# Patient Record
Sex: FEMALE | Race: WHITE | NOT HISPANIC OR LATINO | Employment: UNEMPLOYED | URBAN - METROPOLITAN AREA
[De-identification: names, ages, dates, MRNs, and addresses within clinical notes are randomized per-mention and may not be internally consistent; named-entity substitution may affect disease eponyms.]

---

## 2021-09-09 ENCOUNTER — HOSPITAL ENCOUNTER (EMERGENCY)
Facility: HOSPITAL | Age: 61
Discharge: HOME | End: 2021-09-10
Attending: EMERGENCY MEDICINE | Admitting: EMERGENCY MEDICINE

## 2021-09-09 DIAGNOSIS — R23.8 COVID TOES: Primary | ICD-10-CM

## 2021-09-09 DIAGNOSIS — U07.1 COVID TOES: Primary | ICD-10-CM

## 2021-09-09 LAB — SARS-COV-2 RNA PNL SPEC NAA+PROBE: DETECTED

## 2021-09-09 PROCEDURE — 99283 EMERGENCY DEPT VISIT LOW MDM: CPT

## 2021-09-09 PROCEDURE — 87635 SARS-COV-2 COVID-19 AMP PRB: CPT | Performed by: EMERGENCY MEDICINE

## 2021-09-09 PROCEDURE — C9803 HOPD COVID-19 SPEC COLLECT: HCPCS | Performed by: EMERGENCY MEDICINE

## 2021-09-09 RX ORDER — DIPHENHYDRAMINE HYDROCHLORIDE 50 MG/ML
50 INJECTION INTRAMUSCULAR; INTRAVENOUS ONCE AS NEEDED
Status: DISCONTINUED | OUTPATIENT
Start: 2021-09-09 | End: 2021-09-10 | Stop reason: HOSPADM

## 2021-09-09 RX ORDER — EPINEPHRINE 1 MG/ML
0.3 INJECTION, SOLUTION, CONCENTRATE INTRAVENOUS ONCE AS NEEDED
Status: DISCONTINUED | OUTPATIENT
Start: 2021-09-09 | End: 2021-09-10 | Stop reason: HOSPADM

## 2021-09-09 RX ORDER — SODIUM CHLORIDE 9 MG/ML
30 INJECTION, SOLUTION INTRAVENOUS ONCE
Status: COMPLETED | OUTPATIENT
Start: 2021-09-10 | End: 2021-09-10

## 2021-09-09 RX ORDER — METHYLPREDNISOLONE SODIUM SUCCINATE 125 MG/2ML
125 INJECTION, POWDER, LYOPHILIZED, FOR SOLUTION INTRAMUSCULAR; INTRAVENOUS ONCE AS NEEDED
Status: DISCONTINUED | OUTPATIENT
Start: 2021-09-09 | End: 2021-09-10 | Stop reason: HOSPADM

## 2021-09-10 VITALS
BODY MASS INDEX: 27.14 KG/M2 | RESPIRATION RATE: 16 BRPM | TEMPERATURE: 100.3 F | WEIGHT: 189.6 LBS | SYSTOLIC BLOOD PRESSURE: 95 MMHG | OXYGEN SATURATION: 95 % | DIASTOLIC BLOOD PRESSURE: 60 MMHG | HEART RATE: 52 BPM | HEIGHT: 70 IN

## 2021-09-10 PROCEDURE — M0243 CASIRIVI AND IMDEVI INFUSION: HCPCS | Performed by: EMERGENCY MEDICINE

## 2021-09-10 PROCEDURE — 25010000006 INJECTION, CASIRIVIMAB AND IMDEVIMAB, 1200 MG: Performed by: EMERGENCY MEDICINE

## 2021-09-10 RX ADMIN — SODIUM CHLORIDE 30 ML: 9 INJECTION, SOLUTION INTRAVENOUS at 01:01

## 2021-09-10 RX ADMIN — CASIRIVIMAB AND IMDEVIMAB 1200 MG: 600; 600 INJECTION, SOLUTION, CONCENTRATE INTRAVENOUS at 00:40

## 2021-09-10 NOTE — ED NOTES
Pt reports cough, fever, body aches, loss of taste, and chills x1week        Camille Hendricks RN  09/10/21 0015

## 2021-09-10 NOTE — DISCHARGE INSTRUCTIONS
Remained isolated for 10 to 14 days until symptoms have improved.  May use Tylenol, ibuprofen for pain, return new or worsening symptoms or trouble breathing.

## 2021-09-10 NOTE — ED PROVIDER NOTES
Subjective   History of Present Illness  61-year-old female presents with cough headache mild body aches.  She has had nausea little bit of diarrhea.  She does not complain of being short of breath at this time.  She has had some fevers.  She states symptoms started 1 week ago today.  She has not been vaccinated.  Review of Systems  Negative for vomiting positive for headache cough body aches nausea little bit of diarrhea  No past medical history on file.  Anxiety depression  No Known Allergies    No past surgical history on file.    No family history on file.    Social History     Socioeconomic History   • Marital status:      Spouse name: Not on file   • Number of children: Not on file   • Years of education: Not on file   • Highest education level: Not on file   Only home medication Zoloft        Objective   Physical Exam  51-year-old female awake alert.  Generally well-developed well-nourished.  Pupils equal round at light.  Neck supple chest clear equal breath sounds cardiovascular regular and rhythm abdomen soft nontender extremity no tenderness edema.  Skin without rash noted.  Neurologically without focal findings noted  Procedures           ED Course      Results for orders placed or performed during the hospital encounter of 09/09/21   COVID-19,CEPHEID/URIEL/BDMAX,COR/WILSON/PAD/MO IN-HOUSE(OR EMERGENT/ADD-ON),NP SWAB IN TRANSPORT MEDIA 3-4 HR TAT, RT-PCR - Swab, Nasopharynx    Specimen: Nasopharynx; Swab   Result Value Ref Range    COVID19 Detected (C) Not Detected - Ref. Range     No radiology results for the last day  Medications   EPINEPHrine PF (ADRENALIN) injection 0.3 mg (has no administration in time range)   diphenhydrAMINE (BENADRYL) injection 50 mg (has no administration in time range)   methylPREDNISolone sodium succinate (SOLU-Medrol) injection 125 mg (has no administration in time range)   INV casirivimab / imdevimab 1200 mg in 60 mL NS IVPB (premix) ( Intravenous Stopped 9/10/21 0100)  "  sodium chloride 0.9 % infusion 30 mL (30 mL Intravenous New Bag 9/10/21 0101)     BP 90/54 (BP Location: Right arm, Patient Position: Sitting)   Pulse 63   Temp 99.5 °F (37.5 °C) (Oral)   Resp 16   Ht 177.8 cm (70\")   Wt 86 kg (189 lb 9.5 oz)   SpO2 94%   BMI 27.20 kg/m²                                        MDM  No previous records for review.  Patient's findings were discussed with her.  She had antibody infusion discussed.  After discussion elected to proceed.  This was administered without complication.  She was discharged.  Advised to remain quarantined.  To use Tylenol ibuprofen for aches pain or fever.  To return new or worsening symptoms or if with develop trouble breathing.  Advised could obtain pulse oximeter to monitor her oxygen at home.  Final diagnoses:   COVID toes       ED Disposition  ED Disposition     ED Disposition Condition Comment    Discharge Stable           PATIENT CONNECTION - [facility] [[email or web address]] [id number]  [phone]             Medication List      No changes were made to your prescriptions during this visit.          Paul Cahparro MD  09/10/21 0127    "

## 2023-08-09 ENCOUNTER — OFFICE VISIT (OUTPATIENT)
Dept: FAMILY MEDICINE CLINIC | Facility: CLINIC | Age: 63
End: 2023-08-09

## 2023-08-09 VITALS
DIASTOLIC BLOOD PRESSURE: 60 MMHG | HEART RATE: 56 BPM | SYSTOLIC BLOOD PRESSURE: 114 MMHG | RESPIRATION RATE: 18 BRPM | BODY MASS INDEX: 29.58 KG/M2 | WEIGHT: 206.6 LBS | OXYGEN SATURATION: 96 % | HEIGHT: 70 IN | TEMPERATURE: 97.5 F

## 2023-08-09 DIAGNOSIS — L98.9 LESION OF SKIN OF CHEEK: ICD-10-CM

## 2023-08-09 DIAGNOSIS — Z85.038 HISTORY OF COLON CANCER: ICD-10-CM

## 2023-08-09 DIAGNOSIS — Z28.21 TETANUS, DIPHTHERIA, AND ACELLULAR PERTUSSIS (TDAP) VACCINATION DECLINED: ICD-10-CM

## 2023-08-09 DIAGNOSIS — F33.42 RECURRENT MAJOR DEPRESSIVE DISORDER, IN FULL REMISSION: ICD-10-CM

## 2023-08-09 DIAGNOSIS — Z11.59 NEED FOR HEPATITIS C SCREENING TEST: ICD-10-CM

## 2023-08-09 DIAGNOSIS — Z12.31 BREAST CANCER SCREENING BY MAMMOGRAM: ICD-10-CM

## 2023-08-09 DIAGNOSIS — E66.3 OVERWEIGHT (BMI 25.0-29.9): ICD-10-CM

## 2023-08-09 DIAGNOSIS — Z00.00 WELLNESS EXAMINATION: Primary | ICD-10-CM

## 2023-08-09 NOTE — PROGRESS NOTES
Chief Complaint  Annual Exam, Depression, and Establish Care    Subjective          Mali is a 62 y.o. female  who presents to [facility]     Patient Care Team:  Hui Noe APRN as PCP - General (Family Medicine)     History of Present Illness  Mali is here today to establish care.  Previous PCP Dr. Garrett Abdi MD in [place]  New patient    Adult Annual Wellness    Social History    Tobacco Use      Smoking status: Former        Packs/day: 1.00        Years: 4.00        Pack years: 4        Types: Cigarettes        Quit date: 1981        Years since quittin.7      Smokeless tobacco: Never    Vaping Use      Vaping Use: Never used    Alcohol use: Not Currently    Drug use: Not Currently      Comment: I dont remember    General health habits  Last eye exam - in the last year, wears glasses for reading  Last dental exam - a few years ago    Diet - Regular diet    Weight / BMI - overweight, BMI 29.6    Exercise -  no    Hours of sleep per night ?    Safety -   Do you use seat belts consistently ? yes  Working smoke detector in home ? yes      Reproductive health -  Sexually active -  yes  Birth control - menopause  History of abnormal pap smear? No    Screening/prevention  Last mammogram - unsure date  Last pap smear/ cervical cancer screening - unsure date  Colon cancer screening - unsure date  Immunizations - needs Tdap but declines today    Dx with colon cancer in late    S/p colon surgery (removed 12-18 inches) in [place], [facility] chemo and radiation after surgery in [place]  Last colonoscopy after surgery but unsure date.    Patient presents for follow-up of depression  This is an established problem.    She has been on sertraline for about 3 years  Interim treatment changes: none  Current medications: sertraline 50 mg daily  She needs a refill.    Lesion inside mouth left cheek  Lesion for 2 months  Not painful  It changes in size      Mali  Soo  has a past medical history of Depression, Dyslipidemia, and History of colon cancer.      Review of Systems   Constitutional:  Negative for fatigue and fever.   HENT:  Negative for ear pain, sore throat, trouble swallowing and voice change.    Eyes:  Negative for visual disturbance.   Respiratory:  Negative for cough and shortness of breath.    Cardiovascular:  Negative for chest pain, palpitations and leg swelling.   Gastrointestinal:  Negative for abdominal pain, blood in stool, nausea and vomiting.        No heartburn   Genitourinary:  Negative for dysuria, frequency, hematuria and urgency.   Musculoskeletal:  Positive for arthralgias (knees). Negative for myalgias.   Skin:  Negative for rash.   Neurological:  Negative for dizziness and headaches.   Psychiatric/Behavioral:  Positive for dysphoric mood (on zoloft).       Family History   Problem Relation Age of Onset    Hypertension Mother     Diabetes Father     Lung cancer Father     Cancer Sister     Kidney cancer Maternal Grandmother     Lymphoma Maternal Grandfather         Past Surgical History:   Procedure Laterality Date    CHOLE[place]STECTOMY      in [place]    COLON SURGERY      12-18 inches colon removed due to cancer; [place]    COLONOSCOPY      in [place] Dr. Beck        Social History     Socioeconomic History    Marital status:      Spouse name: Dilip    Number of children: 4    Highest education level: High school graduate   Tobacco Use    Smoking status: Former     Packs/day: 1.00     Years: 4.00     Pack years: 4.00     Types: Cigarettes     Quit date: 1981     Years since quittin.7    Smokeless tobacco: Never   Vaping Use    Vaping Use: Never used   Substance and Sexual Activity    Alcohol use: Not Currently    Drug use: Not Currently     Comment: I dont remember    Sexual activity: Yes     Partners: Male     Birth control/protection: Post-menopausal          There is no immunization history on  "file for this patient.    Menstrual History:  OB History          4    Para   4    Term   4            AB   0    Living   4         SAB        IAB        Ectopic        Molar        Multiple        Live Births   4          Obstetric Comments   Menarche - unsure age  LMP - in 30's               No LMP recorded. Patient is postmenopausal.         Objective       Current Outpatient Medications:     sertraline (ZOLOFT) 50 MG tablet, Take 1 tablet by mouth Daily., Disp: 90 tablet, Rfl: 1    Vital Signs:      /60 (BP Location: Right arm, Patient Position: Sitting, Cuff Size: Large Adult)   Pulse 56   Temp 97.5 øF (36.4 øC) (Temporal)   Resp 18   Ht 177.8 cm (70\")   Wt 93.7 kg (206 lb 9.6 oz)   SpO2 96%   BMI 29.64 kg/mý     Vitals:    23 1044   BP: 114/60   BP Location: Right arm   Patient Position: Sitting   Cuff Size: Large Adult   Pulse: 56   Resp: 18   Temp: 97.5 øF (36.4 øC)   TempSrc: Temporal   SpO2: 96%   Weight: 93.7 kg (206 lb 9.6 oz)   Height: 177.8 cm (70\")   PainSc:   5   PainLoc: Knee      Physical Exam  Vitals reviewed.   Constitutional:       General: She is not in acute distress.     Appearance: Normal appearance.   HENT:      Head: Normocephalic and atraumatic.      Right Ear: Tympanic membrane, ear canal and external ear normal.      Left Ear: Tympanic membrane, ear canal and external ear normal.      Nose: Nose normal.      Mouth/Throat:      Mouth: Mucous membranes are moist.      Pharynx: Oropharynx is clear.      Comments: Purplish lesion inside mouth in left cheek.  Not painful.  Eyes:      General: No scleral icterus.     Conjunctiva/sclera: Conjunctivae normal.   Neck:      Thyroid: No thyromegaly.   Cardiovascular:      Rate and Rhythm: Normal rate and regular rhythm.      Heart sounds: Normal heart sounds.   Pulmonary:      Effort: Pulmonary effort is normal. No respiratory distress.      Breath sounds: Normal breath sounds. No wheezing.   Abdominal:      " General: Bowel sounds are normal. There is no distension.      Palpations: Abdomen is soft. There is no mass.      Tenderness: There is no abdominal tenderness. There is no guarding or rebound.   Musculoskeletal:      Cervical back: Neck supple.      Right lower leg: No edema.      Left lower leg: No edema.   Lymphadenopathy:      Cervical: No cervical adenopathy.   Skin:     General: Skin is warm and dry.   Neurological:      Mental Status: She is alert and oriented to person, place, and time.   Psychiatric:         Mood and Affect: Mood normal.      Result Review :                PHQ-2 Depression Screening  Little interest or pleasure in doing things? 0-->not at all   Feeling down, depressed, or hopeless? 0-->not at all   PHQ-2 Total Score 0          Assessment and Plan    Diagnoses and all orders for this visit:    1. Wellness examination (Primary)  Assessment & Plan:  Discussed preventative healthcare.  Labs ordered.  Recommended annual eye and dental exams.  Breast cancer and colon cancer screening discussed and ordered.  Advised to follow up for cervical cancer screening/pap smear. Tdap declined.    Orders:  -     CBC & Differential  -     Comprehensive Metabolic Panel  -     Lipid Panel  -     TSH Rfx On Abnormal To Free T4    2. Recurrent major depressive disorder, in full remission  Assessment & Plan:  Symptoms stable on sertraline and she would like to continue medication.  Continue sertraline 50 mg daily.    Orders:  -     sertraline (ZOLOFT) 50 MG tablet; Take 1 tablet by mouth Daily.  Dispense: 90 tablet; Refill: 1    3. History of colon cancer  Assessment & Plan:  She has not had a colonoscopy in several years.  She would like to see someone in [place]. Refer to Dr. Roque/Philip's office.    Orders:  -     Ambulatory Referral to General Surgery    4. Lesion of skin of cheek  Comments:  Possible mucocele.  Advised to discuss with her dentist.    5. Breast cancer screening by mammogram  -     Mammo  Screening Digital Tomosynthesis Bilateral With CAD    6. Need for hepatitis C screening test  -     Hepatitis C Antibody    7. Overweight (BMI 25.0-29.9)  Assessment & Plan:  Patient's (Body mass index is 29.64 kg/mý.) indicates that they are overweight with health conditions that include none . Weight is newly identified. BMI is above average; BMI management plan is completed. We discussed portion control and increasing exercise.     Orders:  -     Hemoglobin A1c    8. Tetanus, diphtheria, and acellular pertussis (Tdap) vaccination declined  Comments:  Recommended Tdap vaccine and she declined.       Will request records from previous PCP Dr. Abdi    Follow up for annual gyn exam and pap smear.      Follow Up   Return for Follow up for gyn exam/pap smear.  Patient was given instructions and counseling regarding her condition or for health maintenance advice. Please see specific information pulled into the AVS if appropriate.    Patient Instructions   Go to [facility] for blood work.    Make an appointment for a pap smear/gyn exam,

## 2023-08-11 PROBLEM — E66.3 OVERWEIGHT (BMI 25.0-29.9): Status: ACTIVE | Noted: 2023-08-11

## 2023-08-11 PROBLEM — Z00.00 WELLNESS EXAMINATION: Status: ACTIVE | Noted: 2023-08-11

## 2023-08-11 PROBLEM — Z85.038 HISTORY OF COLON CANCER: Status: ACTIVE | Noted: 2023-08-11

## 2023-08-11 PROBLEM — F33.42 RECURRENT MAJOR DEPRESSIVE DISORDER, IN FULL REMISSION: Status: ACTIVE | Noted: 2023-08-11

## 2023-08-11 NOTE — ASSESSMENT & PLAN NOTE
Discussed preventative healthcare.  Labs ordered.  Recommended annual eye and dental exams.  Breast cancer and colon cancer screening discussed and ordered.  Advised to follow up for cervical cancer screening/pap smear. Tdap declined.

## 2023-08-11 NOTE — ASSESSMENT & PLAN NOTE
Symptoms stable on sertraline and she would like to continue medication.  Continue sertraline 50 mg daily.

## 2023-08-11 NOTE — ASSESSMENT & PLAN NOTE
Patient's (Body mass index is 29.64 kg/mý.) indicates that they are overweight with health conditions that include none . Weight is newly identified. BMI is above average; BMI management plan is completed. We discussed portion control and increasing exercise.

## 2023-08-11 NOTE — ASSESSMENT & PLAN NOTE
She has not had a colonoscopy in several years.  She would like to see someone in [place]. Refer to Dr. Roque/Philip's office.

## 2023-08-15 ENCOUNTER — TELEPHONE (OUTPATIENT)
Dept: FAMILY MEDICINE CLINIC | Facility: CLINIC | Age: 63
End: 2023-08-15
Payer: COMMERCIAL

## 2023-08-15 NOTE — TELEPHONE ENCOUNTER
Caller: Mali Vann    Relationship: Self    Best call back number: [phone]    What is the best time to reach you: ANY     Who are you requesting to speak with (clinical staff, provider,  specific staff member): FAHAD LACKEY     Do you know the name of the person who called:     What was the call regarding: CALL BACK, NOT SURE WHAT THE CALL TO THE PATIENT WAS REGARDING. WOULD LIKE TO SPEAK TO PCP TO CLEAR UP THE CONFUSION.     Is it okay if the provider responds through MyChart: NO

## 2023-08-20 DIAGNOSIS — Z85.038 HISTORY OF COLON CANCER: Primary | ICD-10-CM

## 2023-08-21 NOTE — TELEPHONE ENCOUNTER
Please let her know I put in a referral to [facility] for colonoscopy.  She can come by office to  paperwork to fill out and submit to [facility].

## 2024-01-16 ENCOUNTER — TELEPHONE (OUTPATIENT)
Dept: FAMILY MEDICINE CLINIC | Facility: CLINIC | Age: 64
End: 2024-01-16
Payer: COMMERCIAL

## 2024-01-16 NOTE — TELEPHONE ENCOUNTER
Spoke to patient and she states that she plans on completing the lab orders in chart from August.

## 2024-01-30 DIAGNOSIS — F33.42 RECURRENT MAJOR DEPRESSIVE DISORDER, IN FULL REMISSION: ICD-10-CM

## 2024-02-22 ENCOUNTER — OFFICE VISIT (OUTPATIENT)
Dept: FAMILY MEDICINE CLINIC | Facility: CLINIC | Age: 64
End: 2024-02-22
Payer: COMMERCIAL

## 2024-02-22 VITALS
HEART RATE: 51 BPM | WEIGHT: 212.2 LBS | OXYGEN SATURATION: 96 % | DIASTOLIC BLOOD PRESSURE: 72 MMHG | BODY MASS INDEX: 30.38 KG/M2 | SYSTOLIC BLOOD PRESSURE: 118 MMHG | HEIGHT: 70 IN | TEMPERATURE: 97.7 F | RESPIRATION RATE: 20 BRPM

## 2024-02-22 DIAGNOSIS — R73.03 PREDIABETES: Primary | ICD-10-CM

## 2024-02-22 DIAGNOSIS — Z12.31 BREAST CANCER SCREENING BY MAMMOGRAM: ICD-10-CM

## 2024-02-22 DIAGNOSIS — Z12.11 COLON CANCER SCREENING: ICD-10-CM

## 2024-02-22 DIAGNOSIS — Z28.21 INFLUENZA VACCINATION DECLINED: ICD-10-CM

## 2024-02-22 DIAGNOSIS — R74.8 ELEVATED LIVER ENZYMES: ICD-10-CM

## 2024-02-22 DIAGNOSIS — E78.5 DYSLIPIDEMIA: ICD-10-CM

## 2024-02-22 DIAGNOSIS — Z85.038 HISTORY OF COLON CANCER: ICD-10-CM

## 2024-02-22 DIAGNOSIS — Z28.21 TETANUS, DIPHTHERIA, AND ACELLULAR PERTUSSIS (TDAP) VACCINATION DECLINED: ICD-10-CM

## 2024-02-22 DIAGNOSIS — E66.09 CLASS 1 OBESITY DUE TO EXCESS CALORIES WITH SERIOUS COMORBIDITY AND BODY MASS INDEX (BMI) OF 30.0 TO 30.9 IN ADULT: ICD-10-CM

## 2024-02-22 DIAGNOSIS — F33.42 RECURRENT MAJOR DEPRESSIVE DISORDER, IN FULL REMISSION: ICD-10-CM

## 2024-02-22 PROBLEM — E66.811 CLASS 1 OBESITY DUE TO EXCESS CALORIES WITH SERIOUS COMORBIDITY AND BODY MASS INDEX (BMI) OF 30.0 TO 30.9 IN ADULT: Status: ACTIVE | Noted: 2024-02-22

## 2024-02-22 LAB
ALBUMIN SERPL-MCNC: 4.4 G/DL (ref 3.9–4.9)
ALBUMIN/GLOB SERPL: 1.7 {RATIO} (ref 1.2–2.2)
ALP SERPL-CCNC: 79 IU/L (ref 44–121)
ALT SERPL-CCNC: 56 IU/L (ref 0–32)
AST SERPL-CCNC: 44 IU/L (ref 0–40)
BASOPHILS # BLD AUTO: 0.1 X10E3/UL (ref 0–0.2)
BASOPHILS NFR BLD AUTO: 1 %
BILIRUB SERPL-MCNC: 0.7 MG/DL (ref 0–1.2)
BUN SERPL-MCNC: 8 MG/DL (ref 8–27)
BUN/CREAT SERPL: 9 (ref 12–28)
CALCIUM SERPL-MCNC: 9.2 MG/DL (ref 8.7–10.3)
CHLORIDE SERPL-SCNC: 104 MMOL/L (ref 96–106)
CHOLEST SERPL-MCNC: 268 MG/DL (ref 100–199)
CO2 SERPL-SCNC: 23 MMOL/L (ref 20–29)
CREAT SERPL-MCNC: 0.87 MG/DL (ref 0.57–1)
EGFRCR SERPLBLD CKD-EPI 2021: 75 ML/MIN/1.73
EOSINOPHIL # BLD AUTO: 0.2 X10E3/UL (ref 0–0.4)
EOSINOPHIL NFR BLD AUTO: 4 %
ERYTHROCYTE [DISTWIDTH] IN BLOOD BY AUTOMATED COUNT: 14.5 % (ref 11.7–15.4)
GLOBULIN SER CALC-MCNC: 2.6 G/DL (ref 1.5–4.5)
GLUCOSE SERPL-MCNC: 104 MG/DL (ref 70–99)
HBA1C MFR BLD: 5.9 % (ref 4.8–5.6)
HCT VFR BLD AUTO: 43.2 % (ref 34–46.6)
HCV IGG SERPL QL IA: NON REACTIVE
HDLC SERPL-MCNC: 27 MG/DL
HGB BLD-MCNC: 14.1 G/DL (ref 11.1–15.9)
IMM GRANULOCYTES # BLD AUTO: 0 X10E3/UL (ref 0–0.1)
IMM GRANULOCYTES NFR BLD AUTO: 0 %
LDLC SERPL CALC-MCNC: 178 MG/DL (ref 0–99)
LYMPHOCYTES # BLD AUTO: 1.1 X10E3/UL (ref 0.7–3.1)
LYMPHOCYTES NFR BLD AUTO: 25 %
MCH RBC QN AUTO: 28.7 PG (ref 26.6–33)
MCHC RBC AUTO-ENTMCNC: 32.6 G/DL (ref 31.5–35.7)
MCV RBC AUTO: 88 FL (ref 79–97)
MONOCYTES # BLD AUTO: 0.3 X10E3/UL (ref 0.1–0.9)
MONOCYTES NFR BLD AUTO: 6 %
NEUTROPHILS # BLD AUTO: 2.8 X10E3/UL (ref 1.4–7)
NEUTROPHILS NFR BLD AUTO: 64 %
PLATELET # BLD AUTO: 252 X10E3/UL (ref 150–450)
POTASSIUM SERPL-SCNC: 4 MMOL/L (ref 3.5–5.2)
PROT SERPL-MCNC: 7 G/DL (ref 6–8.5)
RBC # BLD AUTO: 4.92 X10E6/UL (ref 3.77–5.28)
SODIUM SERPL-SCNC: 141 MMOL/L (ref 134–144)
TRIGL SERPL-MCNC: 322 MG/DL (ref 0–149)
TSH SERPL DL<=0.005 MIU/L-ACNC: 2.87 UIU/ML (ref 0.45–4.5)
VLDLC SERPL CALC-MCNC: 63 MG/DL (ref 5–40)
WBC # BLD AUTO: 4.5 X10E3/UL (ref 3.4–10.8)

## 2024-02-22 NOTE — PROGRESS NOTES
Chief Complaint  Hyperlipidemia and Prediabetes    Mady Samson is a 63 y.o. female  who presents to [facility]     Patient Care Team:  Hui Noe APRN as PCP - General (Family Medicine)     History of Present Illness  Today's appointment was scheduled for an GYN exam/ pap smear but she states she is not here for that.  Declined pap smear today    Dx with colon cancer in late 1980's   S/p colon surgery (removed 12-18 inches) in [place], [place]   chemo and radiation after surgery in [place]  Last colonoscopy after surgery but unsure date.  She has paperwork to fill out to schedule a colonoscopy with [facility] but she has not filled them out yet.      Patient presents for follow-up of depression  This is an established problem.  Stable  She has been on sertraline for about 3 years  Interim treatment changes: none  Current medications: sertraline 50 mg daily    She had labs done on 2/21/2024      The 10-year ASCVD risk score (Lurdes TANNER, et al., 2019) is: 9.1%    Values used to calculate the score:      Age: 63 years      Sex: Female      Is Non- : No      Diabetic: No      Tobacco smoker: No      Systolic Blood Pressure: 139 mmHg      Is BP treated: No      HDL Cholesterol: 27 mg/dL      Total Cholesterol: 268 mg/dL      Mali Vann  has a past medical history of Depression, Dyslipidemia, History of colon cancer, and Prediabetes (02/21/2024).      Review of Systems   Constitutional:  Negative for fatigue and fever.   HENT:  Negative for ear pain, sore throat, trouble swallowing and voice change.    Eyes:  Negative for visual disturbance.   Respiratory:  Negative for cough and shortness of breath.    Cardiovascular:  Negative for chest pain, palpitations and leg swelling.   Gastrointestinal:  Negative for abdominal pain, blood in stool, nausea and vomiting.        No heartburn   Genitourinary:  Negative for dysuria, frequency, hematuria and  urgency.   Musculoskeletal:  Negative for myalgias.   Skin:  Negative for rash.   Neurological:  Negative for dizziness and headaches.   Psychiatric/Behavioral:  Positive for dysphoric mood (on zoloft).         Family History   Problem Relation Age of Onset    Hypertension Mother     Diabetes Father     Lung cancer Father     Cancer Sister     Kidney cancer Maternal Grandmother     Lymphoma Maternal Grandfather         Past Surgical History:   Procedure Laterality Date    CHOLECYSTECTOMY      in [place], [place]    COLON SURGERY      12-18 inches colon removed due to cancer; In [place]    COLONOSCOPY      in [place], [place] Dr. Beck        Social History     Socioeconomic History    Marital status:      Spouse name: Dilip    Number of children: 4    Highest education level: High school graduate   Tobacco Use    Smoking status: Former     Packs/day: 1.00     Years: 4.00     Additional pack years: 0.00     Total pack years: 4.00     Types: Cigarettes     Quit date: 1981     Years since quittin.3    Smokeless tobacco: Never   Vaping Use    Vaping Use: Never used   Substance and Sexual Activity    Alcohol use: Not Currently    Drug use: Not Currently     Comment: I dont remember    Sexual activity: Yes     Partners: Male     Birth control/protection: Post-menopausal        There is no immunization history on file for this patient.    Menstrual History:  OB History          4    Para   4    Term   4            AB   0    Living   4         SAB        IAB        Ectopic        Molar        Multiple        Live Births   4          Obstetric Comments   Menarche - unsure age  LMP - in 30's                 No LMP recorded. Patient is postmenopausal.       Objective       Current Outpatient Medications:     sertraline (ZOLOFT) 50 MG tablet, Take 1 tablet by mouth once daily, Disp: 30 tablet, Rfl: 0    Vital Signs:      /72   Pulse 51   Temp 97.7 °F (36.5 °C) (Temporal)   Resp 20   " Ht 177.8 cm (70\")   Wt 96.3 kg (212 lb 3.2 oz)   SpO2 96%   BMI 30.45 kg/m²     Vitals:    02/22/24 1300   BP: 118/72   Pulse: 51   Resp: 20   Temp: 97.7 °F (36.5 °C)   TempSrc: Temporal   SpO2: 96%   Weight: 96.3 kg (212 lb 3.2 oz)   Height: 177.8 cm (70\")   PainSc: 0-No pain      Physical Exam  Vitals reviewed.   Constitutional:       General: She is not in acute distress.     Appearance: Normal appearance.   HENT:      Head: Normocephalic and atraumatic.      Right Ear: Tympanic membrane, ear canal and external ear normal.      Left Ear: Tympanic membrane, ear canal and external ear normal.      Mouth/Throat:      Mouth: Mucous membranes are moist.      Pharynx: Oropharynx is clear.      Comments: Purplish lesion inside mouth in left cheek.  Not painful.  Eyes:      General: No scleral icterus.     Conjunctiva/sclera: Conjunctivae normal.   Neck:      Thyroid: No thyromegaly.   Cardiovascular:      Rate and Rhythm: Normal rate and regular rhythm.      Heart sounds: Normal heart sounds.   Pulmonary:      Effort: Pulmonary effort is normal. No respiratory distress.      Breath sounds: Normal breath sounds.   Musculoskeletal:      Cervical back: Neck supple.      Right lower leg: No edema.      Left lower leg: No edema.   Lymphadenopathy:      Cervical: No cervical adenopathy.   Skin:     General: Skin is warm and dry.   Neurological:      Mental Status: She is alert and oriented to person, place, and time.   Psychiatric:         Mood and Affect: Mood normal.        Result Review :   The following data was reviewed by: RASTA Escalona on 02/22/2024:             No visits with results within 6 Month(s) from this visit.   Latest known visit with results is:   Office Visit on 08/09/2023   Component Date Value Ref Range Status    WBC 02/21/2024 4.5  3.4 - 10.8 x10E3/uL Final    RBC 02/21/2024 4.92  3.77 - 5.28 x10E6/uL Final    Hemoglobin 02/21/2024 14.1  11.1 - 15.9 g/dL Final    Hematocrit 02/21/2024 43.2  " 34.0 - 46.6 % Final    MCV 02/21/2024 88  79 - 97 fL Final    MCH 02/21/2024 28.7  26.6 - 33.0 pg Final    MCHC 02/21/2024 32.6  31.5 - 35.7 g/dL Final    RDW 02/21/2024 14.5  11.7 - 15.4 % Final    Platelets 02/21/2024 252  150 - 450 x10E3/uL Final    Neutrophil Rel % 02/21/2024 64  Not Estab. % Final    Lymphocyte Rel % 02/21/2024 25  Not Estab. % Final    Monocyte Rel % 02/21/2024 6  Not Estab. % Final    Eosinophil Rel % 02/21/2024 4  Not Estab. % Final    Basophil Rel % 02/21/2024 1  Not Estab. % Final    Neutrophils Absolute 02/21/2024 2.8  1.4 - 7.0 x10E3/uL Final    Lymphocytes Absolute 02/21/2024 1.1  0.7 - 3.1 x10E3/uL Final    Monocytes Absolute 02/21/2024 0.3  0.1 - 0.9 x10E3/uL Final    Eosinophils Absolute 02/21/2024 0.2  0.0 - 0.4 x10E3/uL Final    Basophils Absolute 02/21/2024 0.1  0.0 - 0.2 x10E3/uL Final    Immature Granulocyte Rel % 02/21/2024 0  Not Estab. % Final    Immature Grans Absolute 02/21/2024 0.0  0.0 - 0.1 x10E3/uL Final    Glucose 02/21/2024 104 (H)  70 - 99 mg/dL Final    BUN 02/21/2024 8  8 - 27 mg/dL Final    Creatinine 02/21/2024 0.87  0.57 - 1.00 mg/dL Final    EGFR Result 02/21/2024 75  >59 mL/min/1.73 Final    BUN/Creatinine Ratio 02/21/2024 9 (L)  12 - 28 Final    Sodium 02/21/2024 141  134 - 144 mmol/L Final    Potassium 02/21/2024 4.0  3.5 - 5.2 mmol/L Final    Chloride 02/21/2024 104  96 - 106 mmol/L Final    Total CO2 02/21/2024 23  20 - 29 mmol/L Final    Calcium 02/21/2024 9.2  8.7 - 10.3 mg/dL Final    Total Protein 02/21/2024 7.0  6.0 - 8.5 g/dL Final    Albumin 02/21/2024 4.4  3.9 - 4.9 g/dL Final    Globulin 02/21/2024 2.6  1.5 - 4.5 g/dL Final    A/G Ratio 02/21/2024 1.7  1.2 - 2.2 Final    Total Bilirubin 02/21/2024 0.7  0.0 - 1.2 mg/dL Final    Alkaline Phosphatase 02/21/2024 79  44 - 121 IU/L Final    AST (SGOT) 02/21/2024 44 (H)  0 - 40 IU/L Final    ALT (SGPT) 02/21/2024 56 (H)  0 - 32 IU/L Final    Total Cholesterol 02/21/2024 268 (H)  100 - 199 mg/dL Final     Triglycerides 02/21/2024 322 (H)  0 - 149 mg/dL Final    HDL Cholesterol 02/21/2024 27 (L)  >39 mg/dL Final    VLDL Cholesterol Loy 02/21/2024 63 (H)  5 - 40 mg/dL Final    LDL Chol Calc (NIH) 02/21/2024 178 (H)  0 - 99 mg/dL Final    TSH 02/21/2024 2.870  0.450 - 4.500 uIU/mL Final    Hep C Virus Ab 02/21/2024 Non Reactive  Non Reactive Final    Comment: HCV antibody alone does not differentiate between previously  resolved infection and active infection. Equivocal and Reactive  HCV antibody results should be followed up with an HCV RNA test  to support the diagnosis of active HCV infection.      Hemoglobin A1C 02/21/2024 5.9 (H)  4.8 - 5.6 % Final    Comment:          Prediabetes: 5.7 - 6.4           Diabetes: >6.4           Glycemic control for adults with diabetes: <7.0         PHQ-2 Depression Screening  Little interest or pleasure in doing things? 0-->not at all   Feeling down, depressed, or hopeless? 0-->not at all   PHQ-2 Total Score 0          Assessment and Plan    Diagnoses and all orders for this visit:    1. Prediabetes (Primary)  Overview:  Hgba1c 5.9 % on 2/21/2024    Assessment & Plan:  New problem  Make lifestyle changes: low carbohydrate, no concentrated sweets, exercise and weight reduction.  Recheck 6-12 months.      2. Dyslipidemia  Assessment & Plan:  Established problem  Discussed ASCVD risk score.  She declines to begin a statin.  Make lifestyle changes: low fat, low cholesterol diet, exercise and weight loss.  Recheck in 1 year.      3. Elevated liver enzymes  Comments:  Recheck in 6 months    4. Recurrent major depressive disorder, in full remission  Assessment & Plan:  Chronic stable problem.  Continue sertraline 50 mg daily.      5. History of colon cancer  Assessment & Plan:  Refer to Dr. Arizmendi for colonoscopy    Orders:  -     Ambulatory Referral to Colorectal Surgery    6. Colon cancer screening  -     Ambulatory Referral to Colorectal Surgery    7. Breast cancer screening by  mammogram  -     Mammo Screening Digital Tomosynthesis Bilateral With CAD    8. Influenza vaccination declined    9. Tetanus, diphtheria, and acellular pertussis (Tdap) vaccination declined    10. Class 1 obesity due to excess calories with serious comorbidity and body mass index (BMI) of 30.0 to 30.9 in adult  Assessment & Plan:  Patient's (Body mass index is 30.45 kg/m².) indicates that they are obese (BMI >30) with health conditions that include dyslipidemias and prediabetes  . Weight is worsening. BMI  is above average; BMI management plan is completed. We discussed portion control and increasing exercise.            Follow Up   Return in about 6 months (around 8/22/2024) for Annual physical.  Patient was given instructions and counseling regarding her condition or for health maintenance advice. Please see specific information pulled into the AVS if appropriate.    There are no Patient Instructions on file for this visit.

## 2024-02-25 DIAGNOSIS — F33.42 RECURRENT MAJOR DEPRESSIVE DISORDER, IN FULL REMISSION: ICD-10-CM

## 2024-02-25 NOTE — ASSESSMENT & PLAN NOTE
Established problem  Discussed ASCVD risk score.  She declines to begin a statin.  Make lifestyle changes: low fat, low cholesterol diet, exercise and weight loss.  Recheck in 1 year.

## 2024-02-25 NOTE — ASSESSMENT & PLAN NOTE
Patient's (Body mass index is 30.45 kg/m².) indicates that they are obese (BMI >30) with health conditions that include dyslipidemias and prediabetes  . Weight is worsening. BMI  is above average; BMI management plan is completed. We discussed portion control and increasing exercise.

## 2024-02-25 NOTE — ASSESSMENT & PLAN NOTE
New problem  Make lifestyle changes: low carbohydrate, no concentrated sweets, exercise and weight reduction.  Recheck 6-12 months.

## 2024-03-07 ENCOUNTER — HOSPITAL ENCOUNTER (OUTPATIENT)
Dept: MAMMOGRAPHY | Facility: HOSPITAL | Age: 64
Discharge: HOME | End: 2024-03-07
Admitting: NURSE PRACTITIONER
Payer: COMMERCIAL

## 2024-03-07 PROCEDURE — 77067 SCR MAMMO BI INCL CAD: CPT

## 2024-03-07 PROCEDURE — 77063 BREAST TOMOSYNTHESIS BI: CPT

## 2024-03-17 ENCOUNTER — PREP FOR SURGERY (OUTPATIENT)
Dept: OTHER | Facility: HOSPITAL | Age: 64
End: 2024-03-17
Payer: COMMERCIAL

## 2024-03-17 DIAGNOSIS — Z12.11 ENCOUNTER FOR SCREENING COLONOSCOPY: Primary | ICD-10-CM

## 2024-03-17 RX ORDER — SODIUM, POTASSIUM,MAG SULFATES 17.5-3.13G
SOLUTION, RECONSTITUTED, ORAL ORAL
Qty: 177 ML | Refills: 0 | Status: SHIPPED | OUTPATIENT
Start: 2024-03-17

## 2024-03-17 RX ORDER — SODIUM CHLORIDE, SODIUM LACTATE, POTASSIUM CHLORIDE, CALCIUM CHLORIDE 600; 310; 30; 20 MG/100ML; MG/100ML; MG/100ML; MG/100ML
30 INJECTION, SOLUTION INTRAVENOUS CONTINUOUS
OUTPATIENT
Start: 2024-03-17

## 2024-08-20 DIAGNOSIS — F33.42 RECURRENT MAJOR DEPRESSIVE DISORDER, IN FULL REMISSION: ICD-10-CM

## 2024-08-23 DIAGNOSIS — F33.42 RECURRENT MAJOR DEPRESSIVE DISORDER, IN FULL REMISSION: ICD-10-CM

## 2024-08-28 PROBLEM — Z12.11 ENCOUNTER FOR SCREENING COLONOSCOPY: Status: ACTIVE | Noted: 2024-03-17

## 2024-08-30 RX ORDER — SODIUM, POTASSIUM,MAG SULFATES 17.5-3.13G
SOLUTION, RECONSTITUTED, ORAL ORAL
Qty: 177 ML | Refills: 0 | Status: SHIPPED | OUTPATIENT
Start: 2024-08-30 | End: 2024-08-30 | Stop reason: SDUPTHER

## 2024-08-30 RX ORDER — SODIUM, POTASSIUM,MAG SULFATES 17.5-3.13G
SOLUTION, RECONSTITUTED, ORAL ORAL
Qty: 177 ML | Refills: 0 | Status: SHIPPED | OUTPATIENT
Start: 2024-08-30

## 2024-09-13 ENCOUNTER — TELEPHONE (OUTPATIENT)
Age: 64
End: 2024-09-13
Payer: COMMERCIAL

## 2024-09-18 ENCOUNTER — ANESTHESIA (OUTPATIENT)
Dept: GASTROENTEROLOGY | Facility: HOSPITAL | Age: 64
End: 2024-09-18
Payer: COMMERCIAL

## 2024-09-18 ENCOUNTER — ANESTHESIA EVENT (OUTPATIENT)
Dept: GASTROENTEROLOGY | Facility: HOSPITAL | Age: 64
End: 2024-09-18
Payer: COMMERCIAL

## 2024-09-18 ENCOUNTER — HOSPITAL ENCOUNTER (OUTPATIENT)
Facility: HOSPITAL | Age: 64
Setting detail: HOSPITAL OUTPATIENT SURGERY
Discharge: HOME | End: 2024-09-18
Attending: STUDENT IN AN ORGANIZED HEALTH CARE EDUCATION/TRAINING PROGRAM | Admitting: STUDENT IN AN ORGANIZED HEALTH CARE EDUCATION/TRAINING PROGRAM
Payer: COMMERCIAL

## 2024-09-18 VITALS
DIASTOLIC BLOOD PRESSURE: 66 MMHG | SYSTOLIC BLOOD PRESSURE: 133 MMHG | WEIGHT: 220 LBS | BODY MASS INDEX: 30.8 KG/M2 | RESPIRATION RATE: 15 BRPM | OXYGEN SATURATION: 94 % | HEART RATE: 51 BPM | HEIGHT: 71 IN | TEMPERATURE: 98.3 F

## 2024-09-18 DIAGNOSIS — Z12.11 ENCOUNTER FOR SCREENING COLONOSCOPY: ICD-10-CM

## 2024-09-18 PROCEDURE — 88305 TISSUE EXAM BY PATHOLOGIST: CPT | Performed by: STUDENT IN AN ORGANIZED HEALTH CARE EDUCATION/TRAINING PROGRAM

## 2024-09-18 PROCEDURE — 25810000003 SODIUM CHLORIDE 0.9 % SOLUTION

## 2024-09-18 PROCEDURE — 25010000002 PROPOFOL 10 MG/ML EMULSION

## 2024-09-18 PROCEDURE — 88342 IMHCHEM/IMCYTCHM 1ST ANTB: CPT | Performed by: STUDENT IN AN ORGANIZED HEALTH CARE EDUCATION/TRAINING PROGRAM

## 2024-09-18 PROCEDURE — 25010000002 ONDANSETRON PER 1 MG

## 2024-09-18 PROCEDURE — 25010000002 GLYCOPYRROLATE 1 MG/5ML SOLUTION PREFILLED SYRINGE

## 2024-09-18 RX ORDER — LIDOCAINE HYDROCHLORIDE 20 MG/ML
INJECTION, SOLUTION EPIDURAL; INFILTRATION; INTRACAUDAL; PERINEURAL AS NEEDED
Status: DISCONTINUED | OUTPATIENT
Start: 2024-09-18 | End: 2024-09-18 | Stop reason: SURG

## 2024-09-18 RX ORDER — PROPOFOL 10 MG/ML
VIAL (ML) INTRAVENOUS AS NEEDED
Status: DISCONTINUED | OUTPATIENT
Start: 2024-09-18 | End: 2024-09-18 | Stop reason: SURG

## 2024-09-18 RX ORDER — GLYCOPYRROLATE 1 MG/5 ML
SYRINGE (ML) INTRAVENOUS AS NEEDED
Status: DISCONTINUED | OUTPATIENT
Start: 2024-09-18 | End: 2024-09-18 | Stop reason: SURG

## 2024-09-18 RX ORDER — ONDANSETRON 2 MG/ML
INJECTION INTRAMUSCULAR; INTRAVENOUS AS NEEDED
Status: DISCONTINUED | OUTPATIENT
Start: 2024-09-18 | End: 2024-09-18 | Stop reason: SURG

## 2024-09-18 RX ORDER — EPHEDRINE SULFATE 5 MG/ML
INJECTION INTRAVENOUS AS NEEDED
Status: DISCONTINUED | OUTPATIENT
Start: 2024-09-18 | End: 2024-09-18 | Stop reason: SURG

## 2024-09-18 RX ORDER — SODIUM CHLORIDE 9 MG/ML
INJECTION, SOLUTION INTRAVENOUS CONTINUOUS PRN
Status: DISCONTINUED | OUTPATIENT
Start: 2024-09-18 | End: 2024-09-18 | Stop reason: SURG

## 2024-09-18 RX ADMIN — SODIUM CHLORIDE: 9 INJECTION, SOLUTION INTRAVENOUS at 10:32

## 2024-09-18 RX ADMIN — PROPOFOL INJECTABLE EMULSION 20 MG: 10 INJECTION, EMULSION INTRAVENOUS at 10:44

## 2024-09-18 RX ADMIN — PROPOFOL INJECTABLE EMULSION 100 MG: 10 INJECTION, EMULSION INTRAVENOUS at 10:36

## 2024-09-18 RX ADMIN — PROPOFOL INJECTABLE EMULSION 30 MG: 10 INJECTION, EMULSION INTRAVENOUS at 10:39

## 2024-09-18 RX ADMIN — Medication 0.1 MG: at 10:36

## 2024-09-18 RX ADMIN — LIDOCAINE HYDROCHLORIDE 60 MG: 20 INJECTION, SOLUTION EPIDURAL; INFILTRATION; INTRACAUDAL; PERINEURAL at 10:36

## 2024-09-18 RX ADMIN — EPHEDRINE SULFATE 5 MG: 5 INJECTION INTRAVENOUS at 10:57

## 2024-09-18 RX ADMIN — PROPOFOL INJECTABLE EMULSION 50 MG: 10 INJECTION, EMULSION INTRAVENOUS at 10:41

## 2024-09-18 RX ADMIN — PROPOFOL INJECTABLE EMULSION 125 MCG/KG/MIN: 10 INJECTION, EMULSION INTRAVENOUS at 10:43

## 2024-09-18 RX ADMIN — EPHEDRINE SULFATE 5 MG: 5 INJECTION INTRAVENOUS at 11:06

## 2024-09-18 RX ADMIN — ONDANSETRON 4 MG: 2 INJECTION INTRAMUSCULAR; INTRAVENOUS at 11:01

## 2024-09-19 ENCOUNTER — TELEPHONE (OUTPATIENT)
Age: 64
End: 2024-09-19
Payer: COMMERCIAL

## 2024-09-20 ENCOUNTER — HOSPITAL ENCOUNTER (OUTPATIENT)
Dept: CT IMAGING | Facility: HOSPITAL | Age: 64
Discharge: HOME | End: 2024-09-20
Admitting: STUDENT IN AN ORGANIZED HEALTH CARE EDUCATION/TRAINING PROGRAM
Payer: COMMERCIAL

## 2024-09-20 ENCOUNTER — OFFICE VISIT (OUTPATIENT)
Age: 64
End: 2024-09-20
Payer: COMMERCIAL

## 2024-09-20 VITALS
DIASTOLIC BLOOD PRESSURE: 73 MMHG | OXYGEN SATURATION: 96 % | BODY MASS INDEX: 29.15 KG/M2 | RESPIRATION RATE: 18 BRPM | HEIGHT: 71 IN | WEIGHT: 208.2 LBS | SYSTOLIC BLOOD PRESSURE: 134 MMHG | TEMPERATURE: 97.2 F | HEART RATE: 55 BPM

## 2024-09-20 DIAGNOSIS — Z85.038 HISTORY OF COLON CANCER: ICD-10-CM

## 2024-09-20 DIAGNOSIS — C18.2 MALIGNANT NEOPLASM OF ASCENDING COLON: ICD-10-CM

## 2024-09-20 DIAGNOSIS — D12.6 HIGH GRADE DYSPLASIA IN COLONIC ADENOMA: ICD-10-CM

## 2024-09-20 DIAGNOSIS — C18.2 MALIGNANT NEOPLASM OF ASCENDING COLON: Primary | ICD-10-CM

## 2024-09-20 LAB
LAB AP CASE REPORT: NORMAL
LAB AP DIAGNOSIS COMMENT: NORMAL
PATH REPORT.FINAL DX SPEC: NORMAL
PATH REPORT.GROSS SPEC: NORMAL

## 2024-09-20 PROCEDURE — 25510000001 IOPAMIDOL PER 1 ML: Performed by: STUDENT IN AN ORGANIZED HEALTH CARE EDUCATION/TRAINING PROGRAM

## 2024-09-20 PROCEDURE — 99214 OFFICE O/P EST MOD 30 MIN: CPT | Performed by: STUDENT IN AN ORGANIZED HEALTH CARE EDUCATION/TRAINING PROGRAM

## 2024-09-20 PROCEDURE — 74177 CT ABD & PELVIS W/CONTRAST: CPT

## 2024-09-20 PROCEDURE — 71260 CT THORAX DX C+: CPT

## 2024-09-20 RX ORDER — IOPAMIDOL 755 MG/ML
100 INJECTION, SOLUTION INTRAVASCULAR
Status: COMPLETED | OUTPATIENT
Start: 2024-09-20 | End: 2024-09-20

## 2024-09-20 RX ADMIN — IOPAMIDOL 100 ML: 755 INJECTION, SOLUTION INTRAVENOUS at 13:24

## 2024-09-25 ENCOUNTER — OFFICE VISIT (OUTPATIENT)
Age: 64
End: 2024-09-25
Payer: COMMERCIAL

## 2024-09-25 VITALS
HEIGHT: 71 IN | HEART RATE: 55 BPM | OXYGEN SATURATION: 98 % | DIASTOLIC BLOOD PRESSURE: 86 MMHG | SYSTOLIC BLOOD PRESSURE: 146 MMHG | TEMPERATURE: 98.2 F | WEIGHT: 208 LBS | BODY MASS INDEX: 29.12 KG/M2

## 2024-09-25 DIAGNOSIS — Z85.038 HISTORY OF COLON CANCER: ICD-10-CM

## 2024-09-25 DIAGNOSIS — C18.2 MALIGNANT NEOPLASM OF ASCENDING COLON: Primary | ICD-10-CM

## 2024-09-25 PROBLEM — C18.9 COLON CANCER: Status: ACTIVE | Noted: 2024-09-25

## 2024-09-25 LAB
LAB AP CASE REPORT: NORMAL
LAB AP DIAGNOSIS COMMENT: NORMAL
PATH REPORT.ADDENDUM SPEC: NORMAL
PATH REPORT.FINAL DX SPEC: NORMAL
PATH REPORT.GROSS SPEC: NORMAL

## 2024-09-25 RX ORDER — ALVIMOPAN 12 MG/1
12 CAPSULE ORAL ONCE
OUTPATIENT
Start: 2024-09-25 | End: 2024-09-25

## 2024-09-25 RX ORDER — ERYTHROMYCIN 500 MG/1
1000 TABLET, COATED ORAL 3 TIMES DAILY
Qty: 6 TABLET | Refills: 0 | Status: SHIPPED | OUTPATIENT
Start: 2024-09-25 | End: 2024-09-26

## 2024-09-25 RX ORDER — PREGABALIN 75 MG/1
75 CAPSULE ORAL ONCE
OUTPATIENT
Start: 2024-09-25 | End: 2024-09-25

## 2024-09-25 RX ORDER — HEPARIN SODIUM 5000 [USP'U]/ML
5000 INJECTION, SOLUTION INTRAVENOUS; SUBCUTANEOUS ONCE
OUTPATIENT
Start: 2024-09-25 | End: 2024-09-25

## 2024-09-25 RX ORDER — NEOMYCIN SULFATE 500 MG/1
1000 TABLET ORAL 3 TIMES DAILY
Qty: 6 TABLET | Refills: 0 | Status: SHIPPED | OUTPATIENT
Start: 2024-09-25 | End: 2024-09-26

## 2024-09-25 RX ORDER — CHLORHEXIDINE GLUCONATE 40 MG/ML
1 SOLUTION TOPICAL 2 TIMES DAILY
Qty: 236 ML | Refills: 0 | Status: SHIPPED | OUTPATIENT
Start: 2024-09-25

## 2024-09-25 RX ORDER — SODIUM, POTASSIUM,MAG SULFATES 17.5-3.13G
SOLUTION, RECONSTITUTED, ORAL ORAL
Qty: 177 ML | Refills: 0 | Status: SHIPPED | OUTPATIENT
Start: 2024-09-25

## 2024-09-25 RX ORDER — ACETAMINOPHEN 500 MG
1000 TABLET ORAL EVERY 6 HOURS
OUTPATIENT
Start: 2024-09-25

## 2024-09-29 NOTE — H&P (VIEW-ONLY)
Colorectal Surgery Followup Note    ID:  Mali Vann;   : 1960  DATE OF VISIT: 2024    Chief Complaint  Follow-up (Follow up to discuss results)       Subjective    Mali is here to discuss CT results and surgery. Her CT didn't show any metastatic disease.She is currently asymptomatic.   Exam  General:  No acute distress  Head: Normocephalic, atraumatic  Neuro: Alert and oriented    Abdomen:  Soft, non-tender, non-distended, no hernias, no hepatomegaly, no splenomegaly. No abnormal, audible bowel sounds.    Assessment  - Colon cancer     Plan / Recommendations  -We reviewed her colonoscopy results again, which revealed a cecal mass and a sigmoid mass. The cecal mass is consistent with adenocarcinoma, while the sigmoid mass shows high-grade dysplasia. Given her history of colorectal cancer, we discussed surgical options, including both segmental and total colectomy with ileorectal anastomosis. After careful consideration, we decided to proceed with a minimally invasive total colectomy with ileorectal anastomosis. We also discussed her elevated risk of anastomotic dehiscence due to her previous radiation treatment.    Other risk of operation include, but are not limited to: bleeding, superficial wound infection, abdominal/pelvic infection, anastomotic leak or stenosis, bowel injury, injury to the ureter, abdominal wall hernia, sexual dysfunction due to nerve damage, stroke, myocardial infarction, pneumonia, pulmonary embolus, urinary tract infection, deep vein thrombosis, even death. Further treatments or operations may be necessary as the situation dictates.    Patient demonstrates understanding and all questions are answered. Patient consents to go ahead with proposed procedure.     - we will refer patient to genetic testing     - she will see medical oncologist after final pathology.    - follow up at the time of surgery     - I have personally reviewed her CT Chest A/P which showed no metastatic  disease      Dickson Arizmendi MD  Colon and Rectal Surgery   [facility]

## 2024-10-15 ENCOUNTER — LAB (OUTPATIENT)
Dept: LAB | Facility: HOSPITAL | Age: 64
End: 2024-10-15
Payer: COMMERCIAL

## 2024-10-15 DIAGNOSIS — Z85.038 HISTORY OF COLON CANCER: ICD-10-CM

## 2024-10-15 DIAGNOSIS — C18.2 MALIGNANT NEOPLASM OF ASCENDING COLON: ICD-10-CM

## 2024-10-15 LAB
ANION GAP SERPL CALCULATED.3IONS-SCNC: 9.4 MMOL/L (ref 5–15)
BASOPHILS # BLD AUTO: 0.04 10*3/MM3 (ref 0–0.2)
BASOPHILS NFR BLD AUTO: 0.7 % (ref 0–1.5)
BUN SERPL-MCNC: 8 MG/DL (ref 8–23)
BUN/CREAT SERPL: 9.8 (ref 7–25)
CALCIUM SPEC-SCNC: 9.5 MG/DL (ref 8.6–10.5)
CEA SERPL-MCNC: 3.63 NG/ML
CHLORIDE SERPL-SCNC: 104 MMOL/L (ref 98–107)
CO2 SERPL-SCNC: 26.6 MMOL/L (ref 22–29)
CREAT SERPL-MCNC: 0.82 MG/DL (ref 0.57–1)
DEPRECATED RDW RBC AUTO: 43.8 FL (ref 37–54)
EGFRCR SERPLBLD CKD-EPI 2021: 80 ML/MIN/1.73
EOSINOPHIL # BLD AUTO: 0.17 10*3/MM3 (ref 0–0.4)
EOSINOPHIL NFR BLD AUTO: 3 % (ref 0.3–6.2)
ERYTHROCYTE [DISTWIDTH] IN BLOOD BY AUTOMATED COUNT: 13.4 % (ref 12.3–15.4)
GLUCOSE SERPL-MCNC: 86 MG/DL (ref 65–99)
HCT VFR BLD AUTO: 40.1 % (ref 34–46.6)
HGB BLD-MCNC: 12.8 G/DL (ref 12–15.9)
IMM GRANULOCYTES # BLD AUTO: 0.02 10*3/MM3 (ref 0–0.05)
IMM GRANULOCYTES NFR BLD AUTO: 0.4 % (ref 0–0.5)
LYMPHOCYTES # BLD AUTO: 1.35 10*3/MM3 (ref 0.7–3.1)
LYMPHOCYTES NFR BLD AUTO: 23.9 % (ref 19.6–45.3)
MCH RBC QN AUTO: 28.4 PG (ref 26.6–33)
MCHC RBC AUTO-ENTMCNC: 31.9 G/DL (ref 31.5–35.7)
MCV RBC AUTO: 89.1 FL (ref 79–97)
MONOCYTES # BLD AUTO: 0.43 10*3/MM3 (ref 0.1–0.9)
MONOCYTES NFR BLD AUTO: 7.6 % (ref 5–12)
NEUTROPHILS NFR BLD AUTO: 3.65 10*3/MM3 (ref 1.7–7)
NEUTROPHILS NFR BLD AUTO: 64.4 % (ref 42.7–76)
NRBC BLD AUTO-RTO: 0 /100 WBC (ref 0–0.2)
PLATELET # BLD AUTO: 230 10*3/MM3 (ref 140–450)
PMV BLD AUTO: 9.4 FL (ref 6–12)
POTASSIUM SERPL-SCNC: 4 MMOL/L (ref 3.5–5.2)
RBC # BLD AUTO: 4.5 10*6/MM3 (ref 3.77–5.28)
SODIUM SERPL-SCNC: 140 MMOL/L (ref 136–145)
WBC NRBC COR # BLD AUTO: 5.66 10*3/MM3 (ref 3.4–10.8)

## 2024-10-15 PROCEDURE — 80048 BASIC METABOLIC PNL TOTAL CA: CPT

## 2024-10-15 PROCEDURE — 82378 CARCINOEMBRYONIC ANTIGEN: CPT

## 2024-10-15 PROCEDURE — 85025 COMPLETE CBC W/AUTO DIFF WBC: CPT

## 2024-10-15 PROCEDURE — 36415 COLL VENOUS BLD VENIPUNCTURE: CPT

## 2024-10-21 ENCOUNTER — ANESTHESIA EVENT (OUTPATIENT)
Dept: PERIOP | Facility: HOSPITAL | Age: 64
End: 2024-10-21
Payer: COMMERCIAL

## 2024-10-21 RX ORDER — ONDANSETRON 4 MG/1
4 TABLET, FILM COATED ORAL DAILY PRN
Qty: 30 TABLET | Refills: 1 | Status: SHIPPED | OUTPATIENT
Start: 2024-10-21 | End: 2024-12-20

## 2024-10-21 RX ORDER — METRONIDAZOLE 500 MG/1
500 TABLET ORAL
Qty: 3 TABLET | Refills: 0 | Status: SHIPPED | OUTPATIENT
Start: 2024-10-21 | End: 2024-10-26 | Stop reason: HOSPADM

## 2024-10-21 NOTE — ANESTHESIA PREPROCEDURE EVALUATION
Anesthesia Evaluation     Patient summary reviewed and Nursing notes reviewed   NPO Solid Status: > 8 hours  NPO Liquid Status: > 8 hours           Airway   Mallampati: II  TM distance: >3 FB  Neck ROM: full  No difficulty expected  Dental - normal exam     Pulmonary - normal exam   Cardiovascular - normal exam    ECG reviewed    (+) hyperlipidemia      Neuro/Psych  GI/Hepatic/Renal/Endo    (+) obesity    Musculoskeletal     Abdominal  - normal exam    Bowel sounds: normal.   Substance History      OB/GYN          Other      history of cancer active                Anesthesia Plan    ASA 3     general with block and ERAS Protocol   total IV anesthesia  (ERAS meds ordered:  tylenol, celebrex)  intravenous induction     Anesthetic plan, risks, benefits, and alternatives have been provided, discussed and informed consent has been obtained with: patient.    Plan discussed with CRNA.    CODE STATUS:

## 2024-10-22 ENCOUNTER — ANESTHESIA (OUTPATIENT)
Dept: PERIOP | Facility: HOSPITAL | Age: 64
End: 2024-10-22
Payer: COMMERCIAL

## 2024-10-22 ENCOUNTER — HOSPITAL ENCOUNTER (INPATIENT)
Facility: HOSPITAL | Age: 64
LOS: 4 days | Discharge: HOME | End: 2024-10-26
Attending: STUDENT IN AN ORGANIZED HEALTH CARE EDUCATION/TRAINING PROGRAM | Admitting: STUDENT IN AN ORGANIZED HEALTH CARE EDUCATION/TRAINING PROGRAM
Payer: COMMERCIAL

## 2024-10-22 DIAGNOSIS — C18.2 MALIGNANT NEOPLASM OF ASCENDING COLON: ICD-10-CM

## 2024-10-22 DIAGNOSIS — Z85.038 HISTORY OF COLON CANCER: ICD-10-CM

## 2024-10-22 LAB
ABO GROUP BLD: NORMAL
ABO GROUP BLD: NORMAL
ANION GAP SERPL CALCULATED.3IONS-SCNC: 11.9 MMOL/L (ref 5–15)
BASOPHILS # BLD AUTO: 0.03 10*3/MM3 (ref 0–0.2)
BASOPHILS NFR BLD AUTO: 0.2 % (ref 0–1.5)
BLD GP AB SCN SERPL QL: NEGATIVE
BUN SERPL-MCNC: 10 MG/DL (ref 8–23)
BUN/CREAT SERPL: 12 (ref 7–25)
CALCIUM SPEC-SCNC: 8.1 MG/DL (ref 8.6–10.5)
CHLORIDE SERPL-SCNC: 104 MMOL/L (ref 98–107)
CO2 SERPL-SCNC: 20.1 MMOL/L (ref 22–29)
CREAT SERPL-MCNC: 0.83 MG/DL (ref 0.57–1)
DEPRECATED RDW RBC AUTO: 45.7 FL (ref 37–54)
EGFRCR SERPLBLD CKD-EPI 2021: 78.8 ML/MIN/1.73
EOSINOPHIL # BLD AUTO: 0 10*3/MM3 (ref 0–0.4)
EOSINOPHIL NFR BLD AUTO: 0 % (ref 0.3–6.2)
ERYTHROCYTE [DISTWIDTH] IN BLOOD BY AUTOMATED COUNT: 13.9 % (ref 12.3–15.4)
GLUCOSE BLDC GLUCOMTR-MCNC: 135 MG/DL (ref 70–105)
GLUCOSE BLDC GLUCOMTR-MCNC: 187 MG/DL (ref 70–105)
GLUCOSE SERPL-MCNC: 170 MG/DL (ref 65–99)
HCT VFR BLD AUTO: 34.6 % (ref 34–46.6)
HGB BLD-MCNC: 11.2 G/DL (ref 12–15.9)
IMM GRANULOCYTES # BLD AUTO: 0.04 10*3/MM3 (ref 0–0.05)
IMM GRANULOCYTES NFR BLD AUTO: 0.2 % (ref 0–0.5)
LYMPHOCYTES # BLD AUTO: 0.66 10*3/MM3 (ref 0.7–3.1)
LYMPHOCYTES NFR BLD AUTO: 4.1 % (ref 19.6–45.3)
MAGNESIUM SERPL-MCNC: 2.3 MG/DL (ref 1.6–2.4)
MCH RBC QN AUTO: 29.2 PG (ref 26.6–33)
MCHC RBC AUTO-ENTMCNC: 32.4 G/DL (ref 31.5–35.7)
MCV RBC AUTO: 90.3 FL (ref 79–97)
MONOCYTES # BLD AUTO: 1.14 10*3/MM3 (ref 0.1–0.9)
MONOCYTES NFR BLD AUTO: 7.1 % (ref 5–12)
NEUTROPHILS NFR BLD AUTO: 14.16 10*3/MM3 (ref 1.7–7)
NEUTROPHILS NFR BLD AUTO: 88.4 % (ref 42.7–76)
NRBC BLD AUTO-RTO: 0 /100 WBC (ref 0–0.2)
PLATELET # BLD AUTO: 241 10*3/MM3 (ref 140–450)
PMV BLD AUTO: 10.1 FL (ref 6–12)
POTASSIUM SERPL-SCNC: 4.7 MMOL/L (ref 3.5–5.2)
RBC # BLD AUTO: 3.83 10*6/MM3 (ref 3.77–5.28)
RH BLD: POSITIVE
RH BLD: POSITIVE
SODIUM SERPL-SCNC: 136 MMOL/L (ref 136–145)
T&S EXPIRATION DATE: NORMAL
WBC NRBC COR # BLD AUTO: 16.03 10*3/MM3 (ref 3.4–10.8)

## 2024-10-22 PROCEDURE — 83735 ASSAY OF MAGNESIUM: CPT | Performed by: STUDENT IN AN ORGANIZED HEALTH CARE EDUCATION/TRAINING PROGRAM

## 2024-10-22 PROCEDURE — 82948 REAGENT STRIP/BLOOD GLUCOSE: CPT

## 2024-10-22 PROCEDURE — 25010000002 ALBUMIN HUMAN 5% PER 50 ML

## 2024-10-22 PROCEDURE — 86900 BLOOD TYPING SEROLOGIC ABO: CPT | Performed by: STUDENT IN AN ORGANIZED HEALTH CARE EDUCATION/TRAINING PROGRAM

## 2024-10-22 PROCEDURE — 25010000002 HEPARIN (PORCINE) PER 1000 UNITS: Performed by: STUDENT IN AN ORGANIZED HEALTH CARE EDUCATION/TRAINING PROGRAM

## 2024-10-22 PROCEDURE — 25010000002 GLYCOPYRROLATE 1 MG/5ML SOLUTION

## 2024-10-22 PROCEDURE — 25010000002 SUGAMMADEX 200 MG/2ML SOLUTION: Performed by: NURSE ANESTHETIST, CERTIFIED REGISTERED

## 2024-10-22 PROCEDURE — 25010000002 KETOROLAC TROMETHAMINE PER 15 MG: Performed by: STUDENT IN AN ORGANIZED HEALTH CARE EDUCATION/TRAINING PROGRAM

## 2024-10-22 PROCEDURE — 25010000002 FENTANYL CITRATE (PF) 100 MCG/2ML SOLUTION

## 2024-10-22 PROCEDURE — 52005 CYSTO W/URTRL CATHJ: CPT | Performed by: STUDENT IN AN ORGANIZED HEALTH CARE EDUCATION/TRAINING PROGRAM

## 2024-10-22 PROCEDURE — 94799 UNLISTED PULMONARY SVC/PX: CPT

## 2024-10-22 PROCEDURE — 86850 RBC ANTIBODY SCREEN: CPT | Performed by: STUDENT IN AN ORGANIZED HEALTH CARE EDUCATION/TRAINING PROGRAM

## 2024-10-22 PROCEDURE — 86901 BLOOD TYPING SEROLOGIC RH(D): CPT | Performed by: STUDENT IN AN ORGANIZED HEALTH CARE EDUCATION/TRAINING PROGRAM

## 2024-10-22 PROCEDURE — 0TJB8ZZ INSPECTION OF BLADDER, VIA NATURAL OR ARTIFICIAL OPENING ENDOSCOPIC: ICD-10-PCS | Performed by: STUDENT IN AN ORGANIZED HEALTH CARE EDUCATION/TRAINING PROGRAM

## 2024-10-22 PROCEDURE — 25010000002 HYDROMORPHONE PER 4 MG: Performed by: STUDENT IN AN ORGANIZED HEALTH CARE EDUCATION/TRAINING PROGRAM

## 2024-10-22 PROCEDURE — P9041 ALBUMIN (HUMAN),5%, 50ML: HCPCS

## 2024-10-22 PROCEDURE — 8E0W4CZ ROBOTIC ASSISTED PROCEDURE OF TRUNK REGION, PERCUTANEOUS ENDOSCOPIC APPROACH: ICD-10-PCS | Performed by: STUDENT IN AN ORGANIZED HEALTH CARE EDUCATION/TRAINING PROGRAM

## 2024-10-22 PROCEDURE — C9290 INJ, BUPIVACAINE LIPOSOME: HCPCS | Performed by: ANESTHESIOLOGY

## 2024-10-22 PROCEDURE — 25010000002 BUPIVACAINE (PF) 0.5 % SOLUTION: Performed by: ANESTHESIOLOGY

## 2024-10-22 PROCEDURE — 88309 TISSUE EXAM BY PATHOLOGIST: CPT | Performed by: STUDENT IN AN ORGANIZED HEALTH CARE EDUCATION/TRAINING PROGRAM

## 2024-10-22 PROCEDURE — 44210 LAPARO TOTAL PROCTOCOLECTOMY: CPT | Performed by: BEHAVIOR TECHNICIAN

## 2024-10-22 PROCEDURE — 0D1B4Z4 BYPASS ILEUM TO CUTANEOUS, PERCUTANEOUS ENDOSCOPIC APPROACH: ICD-10-PCS | Performed by: STUDENT IN AN ORGANIZED HEALTH CARE EDUCATION/TRAINING PROGRAM

## 2024-10-22 PROCEDURE — 44210 LAPARO TOTAL PROCTOCOLECTOMY: CPT | Performed by: STUDENT IN AN ORGANIZED HEALTH CARE EDUCATION/TRAINING PROGRAM

## 2024-10-22 PROCEDURE — 25010000002 FENTANYL CITRATE (PF) 50 MCG/ML SOLUTION

## 2024-10-22 PROCEDURE — 4A1BXSH MONITORING OF GASTROINTESTINAL VASCULAR PERFUSION USING INDOCYANINE GREEN DYE, EXTERNAL APPROACH: ICD-10-PCS | Performed by: STUDENT IN AN ORGANIZED HEALTH CARE EDUCATION/TRAINING PROGRAM

## 2024-10-22 PROCEDURE — 25010000002 HYDROMORPHONE 1 MG/ML SOLUTION

## 2024-10-22 PROCEDURE — 88307 TISSUE EXAM BY PATHOLOGIST: CPT | Performed by: STUDENT IN AN ORGANIZED HEALTH CARE EDUCATION/TRAINING PROGRAM

## 2024-10-22 PROCEDURE — 25810000003 LACTATED RINGERS PER 1000 ML

## 2024-10-22 PROCEDURE — 25010000002 CEFTRIAXONE PER 250 MG: Performed by: STUDENT IN AN ORGANIZED HEALTH CARE EDUCATION/TRAINING PROGRAM

## 2024-10-22 PROCEDURE — 25810000003 SODIUM CHLORIDE 0.9 % SOLUTION

## 2024-10-22 PROCEDURE — S2900 ROBOTIC SURGICAL SYSTEM: HCPCS | Performed by: STUDENT IN AN ORGANIZED HEALTH CARE EDUCATION/TRAINING PROGRAM

## 2024-10-22 PROCEDURE — 0DTE4ZZ RESECTION OF LARGE INTESTINE, PERCUTANEOUS ENDOSCOPIC APPROACH: ICD-10-PCS | Performed by: STUDENT IN AN ORGANIZED HEALTH CARE EDUCATION/TRAINING PROGRAM

## 2024-10-22 PROCEDURE — 25010000002 MAGNESIUM SULFATE PER 500 MG OF MAGNESIUM

## 2024-10-22 PROCEDURE — 25810000003 LACTATED RINGERS PER 1000 ML: Performed by: STUDENT IN AN ORGANIZED HEALTH CARE EDUCATION/TRAINING PROGRAM

## 2024-10-22 PROCEDURE — 86901 BLOOD TYPING SEROLOGIC RH(D): CPT

## 2024-10-22 PROCEDURE — 85025 COMPLETE CBC W/AUTO DIFF WBC: CPT | Performed by: STUDENT IN AN ORGANIZED HEALTH CARE EDUCATION/TRAINING PROGRAM

## 2024-10-22 PROCEDURE — 86900 BLOOD TYPING SEROLOGIC ABO: CPT

## 2024-10-22 PROCEDURE — 0DBU4ZZ EXCISION OF OMENTUM, PERCUTANEOUS ENDOSCOPIC APPROACH: ICD-10-PCS | Performed by: STUDENT IN AN ORGANIZED HEALTH CARE EDUCATION/TRAINING PROGRAM

## 2024-10-22 PROCEDURE — 25010000002 LIDOCAINE PF 2% 2 % SOLUTION

## 2024-10-22 PROCEDURE — 25010000002 PROPOFOL 200 MG/20ML EMULSION

## 2024-10-22 PROCEDURE — 25010000002 INDOCYANINE GREEN 25 MG RECONSTITUTED SOLUTION: Performed by: STUDENT IN AN ORGANIZED HEALTH CARE EDUCATION/TRAINING PROGRAM

## 2024-10-22 PROCEDURE — 80048 BASIC METABOLIC PNL TOTAL CA: CPT | Performed by: STUDENT IN AN ORGANIZED HEALTH CARE EDUCATION/TRAINING PROGRAM

## 2024-10-22 PROCEDURE — 94660 CPAP INITIATION&MGMT: CPT

## 2024-10-22 PROCEDURE — 25010000002 ACETAMINOPHEN 10 MG/ML SOLUTION

## 2024-10-22 PROCEDURE — 25010000002 ONDANSETRON PER 1 MG

## 2024-10-22 PROCEDURE — 0 BUPIVACAINE LIPOSOME 1.3 % SUSPENSION: Performed by: ANESTHESIOLOGY

## 2024-10-22 PROCEDURE — 25010000002 BUPIVACAINE (PF) 0.25 % SOLUTION: Performed by: STUDENT IN AN ORGANIZED HEALTH CARE EDUCATION/TRAINING PROGRAM

## 2024-10-22 PROCEDURE — 25010000002 DEXAMETHASONE PER 1 MG

## 2024-10-22 DEVICE — STAPLER 60 RELOAD BLUE
Type: IMPLANTABLE DEVICE | Site: ABDOMEN | Status: FUNCTIONAL
Brand: SUREFORM

## 2024-10-22 DEVICE — DEV WND/CLS CONTRL TISS STRATAFIX SYMM PDS PLS CTX 60CM VIL: Type: IMPLANTABLE DEVICE | Site: ABDOMEN | Status: FUNCTIONAL

## 2024-10-22 DEVICE — PROXIMATE RELOADABLE LINEAR CUTTER WITH SAFETY LOCK-OUT, 75MM
Type: IMPLANTABLE DEVICE | Site: ABDOMEN | Status: FUNCTIONAL
Brand: PROXIMATE

## 2024-10-22 DEVICE — LIGAMAX 5 MM ENDOSCOPIC MULTIPLE CLIP APPLIER
Type: IMPLANTABLE DEVICE | Site: ABDOMEN | Status: FUNCTIONAL
Brand: LIGAMAX

## 2024-10-22 DEVICE — DEV WND/CLS STRATAFIX SYMM PDS PLS ABS 23CM 9IN VIL: Type: IMPLANTABLE DEVICE | Site: ABDOMEN | Status: FUNCTIONAL

## 2024-10-22 RX ORDER — ACETAMINOPHEN 500 MG
1000 TABLET ORAL ONCE
Status: COMPLETED | OUTPATIENT
Start: 2024-10-22 | End: 2024-10-22

## 2024-10-22 RX ORDER — OXYCODONE HYDROCHLORIDE 5 MG/1
10 TABLET ORAL EVERY 4 HOURS PRN
Status: DISCONTINUED | OUTPATIENT
Start: 2024-10-22 | End: 2024-10-26 | Stop reason: HOSPADM

## 2024-10-22 RX ORDER — FENTANYL CITRATE 50 UG/ML
50 INJECTION, SOLUTION INTRAMUSCULAR; INTRAVENOUS
Status: DISCONTINUED | OUTPATIENT
Start: 2024-10-22 | End: 2024-10-22 | Stop reason: HOSPADM

## 2024-10-22 RX ORDER — GLYCOPYRROLATE 0.2 MG/ML
INJECTION INTRAMUSCULAR; INTRAVENOUS AS NEEDED
Status: DISCONTINUED | OUTPATIENT
Start: 2024-10-22 | End: 2024-10-22 | Stop reason: SURG

## 2024-10-22 RX ORDER — DEXMEDETOMIDINE HYDROCHLORIDE 100 UG/ML
INJECTION, SOLUTION INTRAVENOUS AS NEEDED
Status: DISCONTINUED | OUTPATIENT
Start: 2024-10-22 | End: 2024-10-22 | Stop reason: SURG

## 2024-10-22 RX ORDER — METHOCARBAMOL 750 MG/1
750 TABLET, FILM COATED ORAL 4 TIMES DAILY
Status: DISCONTINUED | OUTPATIENT
Start: 2024-10-22 | End: 2024-10-26 | Stop reason: HOSPADM

## 2024-10-22 RX ORDER — PROPOFOL 10 MG/ML
INJECTION, EMULSION INTRAVENOUS CONTINUOUS PRN
Status: DISCONTINUED | OUTPATIENT
Start: 2024-10-22 | End: 2024-10-22 | Stop reason: SURG

## 2024-10-22 RX ORDER — EPHEDRINE SULFATE 5 MG/ML
INJECTION INTRAVENOUS AS NEEDED
Status: DISCONTINUED | OUTPATIENT
Start: 2024-10-22 | End: 2024-10-22 | Stop reason: SURG

## 2024-10-22 RX ORDER — ACETAMINOPHEN 325 MG/1
650 TABLET ORAL ONCE AS NEEDED
Status: DISCONTINUED | OUTPATIENT
Start: 2024-10-22 | End: 2024-10-22 | Stop reason: HOSPADM

## 2024-10-22 RX ORDER — DEXAMETHASONE SODIUM PHOSPHATE 4 MG/ML
INJECTION, SOLUTION INTRA-ARTICULAR; INTRALESIONAL; INTRAMUSCULAR; INTRAVENOUS; SOFT TISSUE AS NEEDED
Status: DISCONTINUED | OUTPATIENT
Start: 2024-10-22 | End: 2024-10-22 | Stop reason: SURG

## 2024-10-22 RX ORDER — ROCURONIUM BROMIDE 10 MG/ML
INJECTION, SOLUTION INTRAVENOUS AS NEEDED
Status: DISCONTINUED | OUTPATIENT
Start: 2024-10-22 | End: 2024-10-22 | Stop reason: SURG

## 2024-10-22 RX ORDER — ACETAMINOPHEN 500 MG
1000 TABLET ORAL EVERY 6 HOURS
Status: DISCONTINUED | OUTPATIENT
Start: 2024-10-22 | End: 2024-10-22 | Stop reason: HOSPADM

## 2024-10-22 RX ORDER — SCOLOPAMINE TRANSDERMAL SYSTEM 1 MG/1
1 PATCH, EXTENDED RELEASE TRANSDERMAL
Status: DISCONTINUED | OUTPATIENT
Start: 2024-10-22 | End: 2024-10-22 | Stop reason: HOSPADM

## 2024-10-22 RX ORDER — SODIUM CHLORIDE, SODIUM LACTATE, POTASSIUM CHLORIDE, CALCIUM CHLORIDE 600; 310; 30; 20 MG/100ML; MG/100ML; MG/100ML; MG/100ML
75 INJECTION, SOLUTION INTRAVENOUS CONTINUOUS
Status: DISCONTINUED | OUTPATIENT
Start: 2024-10-22 | End: 2024-10-23

## 2024-10-22 RX ORDER — FENTANYL CITRATE 50 UG/ML
25 INJECTION, SOLUTION INTRAMUSCULAR; INTRAVENOUS
Status: DISCONTINUED | OUTPATIENT
Start: 2024-10-22 | End: 2024-10-22 | Stop reason: HOSPADM

## 2024-10-22 RX ORDER — HYDROMORPHONE HYDROCHLORIDE 1 MG/ML
0.25 INJECTION, SOLUTION INTRAMUSCULAR; INTRAVENOUS; SUBCUTANEOUS
Status: DISCONTINUED | OUTPATIENT
Start: 2024-10-22 | End: 2024-10-26 | Stop reason: HOSPADM

## 2024-10-22 RX ORDER — FENTANYL CITRATE 50 UG/ML
INJECTION, SOLUTION INTRAMUSCULAR; INTRAVENOUS AS NEEDED
Status: DISCONTINUED | OUTPATIENT
Start: 2024-10-22 | End: 2024-10-22 | Stop reason: SURG

## 2024-10-22 RX ORDER — BUPIVACAINE HYDROCHLORIDE 2.5 MG/ML
INJECTION, SOLUTION EPIDURAL; INFILTRATION; INTRACAUDAL AS NEEDED
Status: DISCONTINUED | OUTPATIENT
Start: 2024-10-22 | End: 2024-10-22 | Stop reason: HOSPADM

## 2024-10-22 RX ORDER — ONDANSETRON 2 MG/ML
4 INJECTION INTRAMUSCULAR; INTRAVENOUS EVERY 6 HOURS PRN
Status: DISCONTINUED | OUTPATIENT
Start: 2024-10-22 | End: 2024-10-26 | Stop reason: HOSPADM

## 2024-10-22 RX ORDER — HYDRALAZINE HYDROCHLORIDE 20 MG/ML
5 INJECTION INTRAMUSCULAR; INTRAVENOUS
Status: DISCONTINUED | OUTPATIENT
Start: 2024-10-22 | End: 2024-10-22 | Stop reason: HOSPADM

## 2024-10-22 RX ORDER — SODIUM CHLORIDE 9 MG/ML
INJECTION, SOLUTION INTRAVENOUS CONTINUOUS PRN
Status: DISCONTINUED | OUTPATIENT
Start: 2024-10-22 | End: 2024-10-22 | Stop reason: SURG

## 2024-10-22 RX ORDER — OXYCODONE HYDROCHLORIDE 5 MG/1
5 TABLET ORAL ONCE AS NEEDED
Status: DISCONTINUED | OUTPATIENT
Start: 2024-10-22 | End: 2024-10-22 | Stop reason: HOSPADM

## 2024-10-22 RX ORDER — ALBUTEROL SULFATE 0.83 MG/ML
2.5 SOLUTION RESPIRATORY (INHALATION) ONCE AS NEEDED
Status: DISCONTINUED | OUTPATIENT
Start: 2024-10-22 | End: 2024-10-22 | Stop reason: HOSPADM

## 2024-10-22 RX ORDER — HEPARIN SODIUM 5000 [USP'U]/ML
5000 INJECTION, SOLUTION INTRAVENOUS; SUBCUTANEOUS ONCE
Status: COMPLETED | OUTPATIENT
Start: 2024-10-22 | End: 2024-10-22

## 2024-10-22 RX ORDER — NALOXONE HCL 0.4 MG/ML
0.4 VIAL (ML) INJECTION AS NEEDED
Status: DISCONTINUED | OUTPATIENT
Start: 2024-10-22 | End: 2024-10-22 | Stop reason: HOSPADM

## 2024-10-22 RX ORDER — NALOXONE HCL 0.4 MG/ML
0.4 VIAL (ML) INJECTION
Status: DISCONTINUED | OUTPATIENT
Start: 2024-10-22 | End: 2024-10-26 | Stop reason: HOSPADM

## 2024-10-22 RX ORDER — SODIUM CHLORIDE 0.9 % (FLUSH) 0.9 %
10 SYRINGE (ML) INJECTION AS NEEDED
Status: DISCONTINUED | OUTPATIENT
Start: 2024-10-22 | End: 2024-10-22 | Stop reason: HOSPADM

## 2024-10-22 RX ORDER — MAGNESIUM SULFATE HEPTAHYDRATE 500 MG/ML
INJECTION, SOLUTION INTRAMUSCULAR; INTRAVENOUS AS NEEDED
Status: DISCONTINUED | OUTPATIENT
Start: 2024-10-22 | End: 2024-10-22 | Stop reason: SURG

## 2024-10-22 RX ORDER — PREGABALIN 25 MG/1
25 CAPSULE ORAL EVERY 8 HOURS SCHEDULED
Status: DISCONTINUED | OUTPATIENT
Start: 2024-10-22 | End: 2024-10-26 | Stop reason: HOSPADM

## 2024-10-22 RX ORDER — ONDANSETRON 2 MG/ML
INJECTION INTRAMUSCULAR; INTRAVENOUS AS NEEDED
Status: DISCONTINUED | OUTPATIENT
Start: 2024-10-22 | End: 2024-10-22 | Stop reason: SURG

## 2024-10-22 RX ORDER — SODIUM CHLORIDE, SODIUM LACTATE, POTASSIUM CHLORIDE, CALCIUM CHLORIDE 600; 310; 30; 20 MG/100ML; MG/100ML; MG/100ML; MG/100ML
1000 INJECTION, SOLUTION INTRAVENOUS CONTINUOUS
Status: DISPENSED | OUTPATIENT
Start: 2024-10-22 | End: 2024-10-22

## 2024-10-22 RX ORDER — KETOROLAC TROMETHAMINE 30 MG/ML
15 INJECTION, SOLUTION INTRAMUSCULAR; INTRAVENOUS EVERY 6 HOURS PRN
Status: DISPENSED | OUTPATIENT
Start: 2024-10-22 | End: 2024-10-25

## 2024-10-22 RX ORDER — PREGABALIN 75 MG/1
75 CAPSULE ORAL ONCE
Status: COMPLETED | OUTPATIENT
Start: 2024-10-22 | End: 2024-10-22

## 2024-10-22 RX ORDER — ONDANSETRON 2 MG/ML
4 INJECTION INTRAMUSCULAR; INTRAVENOUS ONCE AS NEEDED
Status: DISCONTINUED | OUTPATIENT
Start: 2024-10-22 | End: 2024-10-22 | Stop reason: HOSPADM

## 2024-10-22 RX ORDER — BUPIVACAINE HYDROCHLORIDE 5 MG/ML
INJECTION, SOLUTION EPIDURAL; INTRACAUDAL
Status: COMPLETED | OUTPATIENT
Start: 2024-10-22 | End: 2024-10-22

## 2024-10-22 RX ORDER — DIPHENHYDRAMINE HYDROCHLORIDE 50 MG/ML
12.5 INJECTION INTRAMUSCULAR; INTRAVENOUS
Status: DISCONTINUED | OUTPATIENT
Start: 2024-10-22 | End: 2024-10-22 | Stop reason: HOSPADM

## 2024-10-22 RX ORDER — INDOCYANINE GREEN AND WATER 25 MG
KIT INJECTION AS NEEDED
Status: DISCONTINUED | OUTPATIENT
Start: 2024-10-22 | End: 2024-10-22 | Stop reason: HOSPADM

## 2024-10-22 RX ORDER — ACETAMINOPHEN 10 MG/ML
INJECTION, SOLUTION INTRAVENOUS AS NEEDED
Status: DISCONTINUED | OUTPATIENT
Start: 2024-10-22 | End: 2024-10-22 | Stop reason: SURG

## 2024-10-22 RX ORDER — ALVIMOPAN 12 MG/1
12 CAPSULE ORAL ONCE
Status: COMPLETED | OUTPATIENT
Start: 2024-10-22 | End: 2024-10-22

## 2024-10-22 RX ORDER — INDOCYANINE GREEN AND WATER 25 MG
12.5 KIT INJECTION ONCE
Status: COMPLETED | OUTPATIENT
Start: 2024-10-22 | End: 2024-10-22

## 2024-10-22 RX ORDER — ACETAMINOPHEN 500 MG
1000 TABLET ORAL EVERY 6 HOURS
Status: DISCONTINUED | OUTPATIENT
Start: 2024-10-22 | End: 2024-10-26 | Stop reason: HOSPADM

## 2024-10-22 RX ORDER — CELECOXIB 200 MG/1
200 CAPSULE ORAL ONCE
Status: COMPLETED | OUTPATIENT
Start: 2024-10-22 | End: 2024-10-22

## 2024-10-22 RX ORDER — SODIUM CHLORIDE, SODIUM LACTATE, POTASSIUM CHLORIDE, CALCIUM CHLORIDE 600; 310; 30; 20 MG/100ML; MG/100ML; MG/100ML; MG/100ML
INJECTION, SOLUTION INTRAVENOUS CONTINUOUS PRN
Status: DISCONTINUED | OUTPATIENT
Start: 2024-10-22 | End: 2024-10-22 | Stop reason: SURG

## 2024-10-22 RX ORDER — ALBUMIN, HUMAN INJ 5% 5 %
SOLUTION INTRAVENOUS CONTINUOUS PRN
Status: DISCONTINUED | OUTPATIENT
Start: 2024-10-22 | End: 2024-10-22 | Stop reason: SURG

## 2024-10-22 RX ORDER — LABETALOL HYDROCHLORIDE 5 MG/ML
5 INJECTION, SOLUTION INTRAVENOUS
Status: DISCONTINUED | OUTPATIENT
Start: 2024-10-22 | End: 2024-10-22 | Stop reason: HOSPADM

## 2024-10-22 RX ORDER — OXYCODONE HYDROCHLORIDE 5 MG/1
5 TABLET ORAL EVERY 4 HOURS PRN
Status: DISCONTINUED | OUTPATIENT
Start: 2024-10-22 | End: 2024-10-26 | Stop reason: HOSPADM

## 2024-10-22 RX ORDER — METRONIDAZOLE 500 MG/100ML
500 INJECTION, SOLUTION INTRAVENOUS ONCE
Status: COMPLETED | OUTPATIENT
Start: 2024-10-22 | End: 2024-10-22

## 2024-10-22 RX ORDER — PHENYLEPHRINE HCL IN 0.9% NACL 1 MG/10 ML
SYRINGE (ML) INTRAVENOUS AS NEEDED
Status: DISCONTINUED | OUTPATIENT
Start: 2024-10-22 | End: 2024-10-22 | Stop reason: SURG

## 2024-10-22 RX ORDER — LIDOCAINE HYDROCHLORIDE 10 MG/ML
0.5 INJECTION, SOLUTION EPIDURAL; INFILTRATION; INTRACAUDAL; PERINEURAL ONCE AS NEEDED
Status: DISCONTINUED | OUTPATIENT
Start: 2024-10-22 | End: 2024-10-22 | Stop reason: HOSPADM

## 2024-10-22 RX ADMIN — ROCURONIUM BROMIDE 10 MG: 10 INJECTION, SOLUTION INTRAVENOUS at 12:32

## 2024-10-22 RX ADMIN — ACETAMINOPHEN 1000 MG: 1000 INJECTION INTRAVENOUS at 12:45

## 2024-10-22 RX ADMIN — HEPARIN SODIUM 5000 UNITS: 5000 INJECTION INTRAVENOUS; SUBCUTANEOUS at 06:54

## 2024-10-22 RX ADMIN — Medication 50 MCG: at 14:14

## 2024-10-22 RX ADMIN — PREGABALIN 25 MG: 25 CAPSULE ORAL at 22:51

## 2024-10-22 RX ADMIN — SODIUM CHLORIDE, POTASSIUM CHLORIDE, SODIUM LACTATE AND CALCIUM CHLORIDE 1000 ML: 600; 310; 30; 20 INJECTION, SOLUTION INTRAVENOUS at 06:46

## 2024-10-22 RX ADMIN — ALBUMIN (HUMAN): 12.5 INJECTION, SOLUTION INTRAVENOUS at 10:30

## 2024-10-22 RX ADMIN — OXYCODONE 10 MG: 5 TABLET ORAL at 20:58

## 2024-10-22 RX ADMIN — EPHEDRINE SULFATE 5 MG: 5 INJECTION INTRAVENOUS at 11:02

## 2024-10-22 RX ADMIN — ALBUMIN (HUMAN): 12.5 INJECTION, SOLUTION INTRAVENOUS at 11:31

## 2024-10-22 RX ADMIN — ACETAMINOPHEN 1000 MG: 500 TABLET, FILM COATED ORAL at 22:52

## 2024-10-22 RX ADMIN — HYDROMORPHONE HYDROCHLORIDE 0.5 MG: 1 INJECTION, SOLUTION INTRAMUSCULAR; INTRAVENOUS; SUBCUTANEOUS at 13:56

## 2024-10-22 RX ADMIN — SUGAMMADEX 200 MG: 100 INJECTION, SOLUTION INTRAVENOUS at 15:53

## 2024-10-22 RX ADMIN — SODIUM CHLORIDE, SODIUM LACTATE, POTASSIUM CHLORIDE, AND CALCIUM CHLORIDE: .6; .31; .03; .02 INJECTION, SOLUTION INTRAVENOUS at 07:33

## 2024-10-22 RX ADMIN — SODIUM CHLORIDE, POTASSIUM CHLORIDE, SODIUM LACTATE AND CALCIUM CHLORIDE 75 ML/HR: 600; 310; 30; 20 INJECTION, SOLUTION INTRAVENOUS at 18:04

## 2024-10-22 RX ADMIN — BUPIVACAINE 10 ML: 13.3 INJECTION, SUSPENSION, LIPOSOMAL INFILTRATION at 08:00

## 2024-10-22 RX ADMIN — KETOROLAC TROMETHAMINE 15 MG: 30 INJECTION, SOLUTION INTRAMUSCULAR at 21:03

## 2024-10-22 RX ADMIN — MAGNESIUM SULFATE HEPTAHYDRATE 1 G: 500 INJECTION, SOLUTION INTRAMUSCULAR; INTRAVENOUS at 08:04

## 2024-10-22 RX ADMIN — PREGABALIN 25 MG: 25 CAPSULE ORAL at 18:04

## 2024-10-22 RX ADMIN — PROPOFOL 150 MG: 10 INJECTION, EMULSION INTRAVENOUS at 07:38

## 2024-10-22 RX ADMIN — ROCURONIUM BROMIDE 10 MG: 10 INJECTION, SOLUTION INTRAVENOUS at 11:33

## 2024-10-22 RX ADMIN — GLYCOPYRROLATE 0.1 MG: 0.2 INJECTION INTRAMUSCULAR; INTRAVENOUS at 08:09

## 2024-10-22 RX ADMIN — BUPIVACAINE HYDROCHLORIDE 10 ML: 5 INJECTION, SOLUTION EPIDURAL; INTRACAUDAL; PERINEURAL at 08:00

## 2024-10-22 RX ADMIN — DEXMEDETOMIDINE HYDROCHLORIDE 4 MCG: 100 INJECTION, SOLUTION INTRAVENOUS at 15:05

## 2024-10-22 RX ADMIN — ROCURONIUM BROMIDE 10 MG: 10 INJECTION, SOLUTION INTRAVENOUS at 10:36

## 2024-10-22 RX ADMIN — DEXAMETHASONE SODIUM PHOSPHATE 8 MG: 4 INJECTION, SOLUTION INTRAMUSCULAR; INTRAVENOUS at 07:45

## 2024-10-22 RX ADMIN — ROCURONIUM BROMIDE 30 MG: 10 INJECTION, SOLUTION INTRAVENOUS at 08:25

## 2024-10-22 RX ADMIN — ROCURONIUM BROMIDE 50 MG: 10 INJECTION, SOLUTION INTRAVENOUS at 07:38

## 2024-10-22 RX ADMIN — METHOCARBAMOL 750 MG: 750 TABLET ORAL at 18:04

## 2024-10-22 RX ADMIN — SCOPALAMINE 1 PATCH: 1 PATCH, EXTENDED RELEASE TRANSDERMAL at 07:21

## 2024-10-22 RX ADMIN — SODIUM CHLORIDE, SODIUM LACTATE, POTASSIUM CHLORIDE, AND CALCIUM CHLORIDE: .6; .31; .03; .02 INJECTION, SOLUTION INTRAVENOUS at 09:04

## 2024-10-22 RX ADMIN — CEFTRIAXONE 2000 MG: 2 INJECTION, POWDER, FOR SOLUTION INTRAMUSCULAR; INTRAVENOUS at 07:29

## 2024-10-22 RX ADMIN — DEXMEDETOMIDINE HYDROCHLORIDE 4 MCG: 100 INJECTION, SOLUTION INTRAVENOUS at 14:41

## 2024-10-22 RX ADMIN — DEXMEDETOMIDINE HYDROCHLORIDE 4 MCG: 100 INJECTION, SOLUTION INTRAVENOUS at 14:46

## 2024-10-22 RX ADMIN — GLYCOPYRROLATE 0.1 MG: 0.2 INJECTION INTRAMUSCULAR; INTRAVENOUS at 08:41

## 2024-10-22 RX ADMIN — ACETAMINOPHEN 1000 MG: 500 TABLET, FILM COATED ORAL at 18:04

## 2024-10-22 RX ADMIN — ROCURONIUM BROMIDE 20 MG: 10 INJECTION, SOLUTION INTRAVENOUS at 09:00

## 2024-10-22 RX ADMIN — PROPOFOL 125 MCG/KG/MIN: 10 INJECTION, EMULSION INTRAVENOUS at 07:39

## 2024-10-22 RX ADMIN — ROCURONIUM BROMIDE 20 MG: 10 INJECTION, SOLUTION INTRAVENOUS at 09:48

## 2024-10-22 RX ADMIN — PREGABALIN 75 MG: 75 CAPSULE ORAL at 06:46

## 2024-10-22 RX ADMIN — SODIUM CHLORIDE: 9 INJECTION, SOLUTION INTRAVENOUS at 13:32

## 2024-10-22 RX ADMIN — ROCURONIUM BROMIDE 20 MG: 10 INJECTION, SOLUTION INTRAVENOUS at 13:28

## 2024-10-22 RX ADMIN — FENTANYL CITRATE 50 MCG: 50 INJECTION, SOLUTION INTRAMUSCULAR; INTRAVENOUS at 07:38

## 2024-10-22 RX ADMIN — EPHEDRINE SULFATE 5 MG: 5 INJECTION INTRAVENOUS at 13:00

## 2024-10-22 RX ADMIN — HYDROMORPHONE HYDROCHLORIDE 0.5 MG: 1 INJECTION, SOLUTION INTRAMUSCULAR; INTRAVENOUS; SUBCUTANEOUS at 12:10

## 2024-10-22 RX ADMIN — INDOCYANINE GREEN AND WATER 2.5 MG: KIT at 13:43

## 2024-10-22 RX ADMIN — HYDROMORPHONE HYDROCHLORIDE 0.25 MG: 1 INJECTION, SOLUTION INTRAMUSCULAR; INTRAVENOUS; SUBCUTANEOUS at 19:15

## 2024-10-22 RX ADMIN — FENTANYL CITRATE 50 MCG: 50 INJECTION, SOLUTION INTRAMUSCULAR; INTRAVENOUS at 16:50

## 2024-10-22 RX ADMIN — METHOCARBAMOL 750 MG: 750 TABLET ORAL at 20:58

## 2024-10-22 RX ADMIN — FENTANYL CITRATE 50 MCG: 50 INJECTION, SOLUTION INTRAMUSCULAR; INTRAVENOUS at 09:13

## 2024-10-22 RX ADMIN — METRONIDAZOLE 500 MG: 500 INJECTION, SOLUTION INTRAVENOUS at 07:43

## 2024-10-22 RX ADMIN — CELECOXIB 200 MG: 200 CAPSULE ORAL at 06:46

## 2024-10-22 RX ADMIN — ALVIMOPAN 12 MG: 12 CAPSULE ORAL at 06:46

## 2024-10-22 RX ADMIN — ACETAMINOPHEN 1000 MG: 500 TABLET, FILM COATED ORAL at 06:46

## 2024-10-22 RX ADMIN — ROCURONIUM BROMIDE 10 MG: 10 INJECTION, SOLUTION INTRAVENOUS at 12:16

## 2024-10-22 RX ADMIN — ROCURONIUM BROMIDE 10 MG: 10 INJECTION, SOLUTION INTRAVENOUS at 11:04

## 2024-10-22 RX ADMIN — PROPOFOL 150 MCG/KG/MIN: 10 INJECTION, EMULSION INTRAVENOUS at 07:50

## 2024-10-22 RX ADMIN — ONDANSETRON 4 MG: 2 INJECTION, SOLUTION INTRAMUSCULAR; INTRAVENOUS at 14:50

## 2024-10-22 RX ADMIN — ROCURONIUM BROMIDE 10 MG: 10 INJECTION, SOLUTION INTRAVENOUS at 13:13

## 2024-10-22 RX ADMIN — LIDOCAINE HYDROCHLORIDE 80 MG: 20 INJECTION, SOLUTION EPIDURAL; INFILTRATION; INTRACAUDAL; PERINEURAL at 07:38

## 2024-10-22 RX ADMIN — PROPOFOL 50 MG: 10 INJECTION, EMULSION INTRAVENOUS at 08:04

## 2024-10-22 NOTE — ANESTHESIA PROCEDURE NOTES
Airway  Urgency: elective    Date/Time: 10/22/2024 7:41 AM  Airway not difficult    General Information and Staff    Patient location during procedure: OR  CRNA/CAA: Eleonora Pickens CRNA    Indications and Patient Condition  Indications for airway management: airway protection    Preoxygenated: yes  MILS maintained throughout  Mask difficulty assessment: 1 - vent by mask    Final Airway Details  Final airway type: endotracheal airway      Successful airway: ETT  Cuffed: yes   Successful intubation technique: video laryngoscopy  Facilitating devices/methods: cricoid pressure and intubating stylet  Endotracheal tube insertion site: oral  Blade: Orellana  Blade size: 3  ETT size (mm): 7.0  Cormack-Lehane Classification: grade I - full view of glottis  Placement verified by: chest auscultation and capnometry   Measured from: lips  ETT/EBT  to lips (cm): 21  Number of attempts at approach: 1  Assessment: lips, teeth, and gum same as pre-op and atraumatic intubation

## 2024-10-22 NOTE — PLAN OF CARE
Goal Outcome Evaluation:                                     Pt A&O x4. Family at bedside. Dressing clean, dry, intact. Pt has 2 NAHOMI drains. Ashby in place. Call light within reach. Plan of care ongoing.

## 2024-10-22 NOTE — ANESTHESIA PROCEDURE NOTES
Peripheral Block      Patient reassessed immediately prior to procedure    Patient location during procedure: OR  Start time: 10/22/2024 7:45 AM  Stop time: 10/22/2024 8:00 AM  Reason for block: at surgeon's request and post-op pain management  Performed by  Anesthesiologist: Oliver Hernandez MD  Preanesthetic Checklist  Completed: patient identified, IV checked, site marked, risks and benefits discussed, surgical consent, monitors and equipment checked, pre-op evaluation and timeout performed  Prep:  Pt Position: supine  Sterile barriers:cap, gloves, mask and washed/disinfected hands  Prep: ChloraPrep  Patient monitoring: blood pressure monitoring, continuous pulse oximetry and EKG  Procedure    Sedation: no  Performed under: general  Guidance:ultrasound guided  Images:still images obtained, printed/placed on chart    Laterality:Bilateral  Block Type:TAP  Injection Technique:single-shot  Needle Type:echogenic  Needle Gauge:20 G  Resistance on Injection: none    Medications Used: bupivacaine liposome (EXPAREL) injection 1.3% - Perineural   10 mL - 10/22/2024 8:00:00 AM  bupivacaine PF (MARCAINE) injection 0.5% - Perineural   10 mL - 10/22/2024 8:00:00 AM      Medications  Preservative Free Saline:10ml  Comment:Present and assisted for block performed by Ambika Arias and Cady Leger    Post Assessment  Injection Assessment: negative aspiration for heme and incremental injection  Patient Tolerance:comfortable throughout block  Complications:no  Performed by: Oliver Hernandez MD

## 2024-10-22 NOTE — BRIEF OP NOTE
COLON RESECTION LAPAROSCOPIC RIGHT WITH DAVINCI ROBOT  Progress Note    Mali Vann  10/22/2024    Pre-op Diagnosis:   Malignant neoplasm of ascending colon [C18.2]  History of colon cancer [Z85.038]       Post-Op Diagnosis Codes:     * Malignant neoplasm of ascending colon [C18.2]     * History of colon cancer [Z85.038]    Procedure/CPT® Codes:        Procedure(s):  LAPAROSCOPIC TOTAL COLECTOMY WITH DAVINCI ROBOT AND ILEOSTOMY  CYSTOSCOPY              Surgeon(s):  Elmo Castro MD Almaz, Biruk, MD Almaz, Biruk, MD    Anesthesia: General with Block    Staff:   Circulator: Lou Aaron, MANI; Honey Melara, MANI; Imelda Cox, MANI; Marisa Ortega RN  Scrub Person: Sade Knott RN; Winnie Young; Carolin Blancas; Isabelle Ruiz  Assistant: Esther Mccarty RN CSA  Endo Nurse: Leoncio Addison RN  Assistant: Esther Mccarty RN CSA      Estimated Blood Loss: 700 mL    Urine Voided: 300 mL    Specimens:                Specimens       ID Source Type Tests Collected By Collected At Frozen?    A Omentum Tissue TISSUE PATHOLOGY EXAM   Dickson Arizmendi MD 10/22/24 1322 No    B Large Intestine, Sigmoid Colon Tissue TISSUE PATHOLOGY EXAM   Dickson Arizmendi MD 10/22/24 1354 No    Description: total colon. need distal margin.                  Drains:   Closed/Suction Drain 1 RLQ Bulb 19 Fr. (Active)   Dressing Status Clean;Dry;Intact 10/22/24 1545   Status To bulb suction 10/22/24 1545       Closed/Suction Drain 2 LLQ Bulb 19 Fr. (Active)   Dressing Status Clean;Dry;Intact 10/22/24 1545   Status To bulb suction 10/22/24 1545       Ileostomy RLQ (Active)   Stomal Appliance 2 piece;Clean;Dry;Intact 10/22/24 1550   Stoma Appearance rosebud appearance 10/22/24 1550   Peristomal Assessment Clean;Intact 10/22/24 1550       Urethral Catheter Double-lumen;Non-latex 16 Fr. 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    Dickson Arizmendi MD     Date: 10/22/2024  Time: 16:25 EDT

## 2024-10-23 PROCEDURE — 99024 POSTOP FOLLOW-UP VISIT: CPT | Performed by: STUDENT IN AN ORGANIZED HEALTH CARE EDUCATION/TRAINING PROGRAM

## 2024-10-23 PROCEDURE — 97162 PT EVAL MOD COMPLEX 30 MIN: CPT

## 2024-10-23 PROCEDURE — 25810000003 LACTATED RINGERS SOLUTION: Performed by: STUDENT IN AN ORGANIZED HEALTH CARE EDUCATION/TRAINING PROGRAM

## 2024-10-23 PROCEDURE — 25010000002 HEPARIN (PORCINE) PER 1000 UNITS: Performed by: STUDENT IN AN ORGANIZED HEALTH CARE EDUCATION/TRAINING PROGRAM

## 2024-10-23 RX ORDER — HEPARIN SODIUM 5000 [USP'U]/ML
5000 INJECTION, SOLUTION INTRAVENOUS; SUBCUTANEOUS EVERY 8 HOURS SCHEDULED
Status: DISCONTINUED | OUTPATIENT
Start: 2024-10-23 | End: 2024-10-26 | Stop reason: HOSPADM

## 2024-10-23 RX ADMIN — OXYCODONE 5 MG: 5 TABLET ORAL at 04:47

## 2024-10-23 RX ADMIN — OXYCODONE 5 MG: 5 TABLET ORAL at 11:46

## 2024-10-23 RX ADMIN — HEPARIN SODIUM 5000 UNITS: 5000 INJECTION INTRAVENOUS; SUBCUTANEOUS at 21:31

## 2024-10-23 RX ADMIN — SERTRALINE 50 MG: 50 TABLET, FILM COATED ORAL at 09:09

## 2024-10-23 RX ADMIN — PREGABALIN 25 MG: 25 CAPSULE ORAL at 13:55

## 2024-10-23 RX ADMIN — METHOCARBAMOL 750 MG: 750 TABLET ORAL at 18:10

## 2024-10-23 RX ADMIN — ACETAMINOPHEN 1000 MG: 500 TABLET, FILM COATED ORAL at 21:31

## 2024-10-23 RX ADMIN — METHOCARBAMOL 750 MG: 750 TABLET ORAL at 21:31

## 2024-10-23 RX ADMIN — SODIUM CHLORIDE, POTASSIUM CHLORIDE, SODIUM LACTATE AND CALCIUM CHLORIDE 500 ML: 600; 310; 30; 20 INJECTION, SOLUTION INTRAVENOUS at 05:45

## 2024-10-23 RX ADMIN — ACETAMINOPHEN 1000 MG: 500 TABLET, FILM COATED ORAL at 04:46

## 2024-10-23 RX ADMIN — HEPARIN SODIUM 5000 UNITS: 5000 INJECTION INTRAVENOUS; SUBCUTANEOUS at 13:55

## 2024-10-23 RX ADMIN — METHOCARBAMOL 750 MG: 750 TABLET ORAL at 09:09

## 2024-10-23 RX ADMIN — OXYCODONE 5 MG: 5 TABLET ORAL at 18:14

## 2024-10-23 RX ADMIN — PREGABALIN 25 MG: 25 CAPSULE ORAL at 21:30

## 2024-10-23 RX ADMIN — METHOCARBAMOL 750 MG: 750 TABLET ORAL at 11:46

## 2024-10-23 RX ADMIN — PREGABALIN 25 MG: 25 CAPSULE ORAL at 04:47

## 2024-10-23 RX ADMIN — ACETAMINOPHEN 1000 MG: 500 TABLET, FILM COATED ORAL at 18:10

## 2024-10-23 NOTE — ANESTHESIA POSTPROCEDURE EVALUATION
Patient: Mali Vann    Procedure Summary       Date: 10/22/24 Room / Location: [facility] OR 11 / [facility] MAIN OR    Anesthesia Start: 0733 Anesthesia Stop: 1614    Procedures:       LAPAROSCOPIC TOTAL COLECTOMY WITH DAVINCI ROBOT AND ILEOSTOMY (Abdomen)      CYSTOSCOPY (Urethra) Diagnosis:       Malignant neoplasm of ascending colon      History of colon cancer      (Malignant neoplasm of ascending colon [C18.2])      (History of colon cancer [Z85.038])    Surgeons: Dickson Arizmendi MD Provider: Eleonora Pickens CRNA    Anesthesia Type: general with block, ERAS Protocol ASA Status: 3            Anesthesia Type: general with block, ERAS Protocol    Vitals  Vitals Value Taken Time   /58 10/22/24 1724   Temp 98.8 °F (37.1 °C) 10/22/24 1720   Pulse 86 10/22/24 1724   Resp 13 10/22/24 1720   SpO2 92 % 10/22/24 1724   Vitals shown include unfiled device data.        Post Anesthesia Care and Evaluation    Patient location during evaluation: PACU  Patient participation: complete - patient participated  Level of consciousness: awake  Pain scale: See nurse's notes for pain score.  Pain management: adequate    Airway patency: patent  Anesthetic complications: No anesthetic complications  PONV Status: none  Cardiovascular status: acceptable  Respiratory status: acceptable and spontaneous ventilation  Hydration status: acceptable    Comments: Patient seen and examined postoperatively; vital signs stable; SpO2 greater than or equal to 90%; cardiopulmonary status stable; nausea/vomiting adequately controlled; pain adequately controlled; no apparent anesthesia complications; patient discharged from anesthesia care when discharge criteria were met

## 2024-10-23 NOTE — PLAN OF CARE
Goal Outcome Evaluation:                               Pt A&O x4. Family to remain at bedside. Ashby to stay until tomorrow. Pt has 2 NAHOMI drains, dressings changed. Pain treated per MAR. Safety precautions maintained. Plan of care ongoing.

## 2024-10-23 NOTE — PLAN OF CARE
Problem: Adult Inpatient Plan of Care  Goal: Plan of Care Review  Recent Flowsheet Documentation  Taken 10/23/2024 1114 by Migdalia Vogt PT  Outcome Evaluation: 63 yo female adm 10/22/24 for abdominal pain. Recent dx of colon cancer. On 10/22, underwent total colectomy w/ davinci robot and ileostomy. PMH: relatively insignificant. 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 Lives w/  in single level function home w/ 5 stairs and handrail to enter. Today, pt is hypotensive, but her BP remains stable w/ activity. Pt has dizziness upon sitting and standing. She also had difficulty w/ postural control in sitting and standing. Needs 2 person assist for safety when changing positions. However, she is able to transfer to chair this date. Pain elevates w/ movement but is controlled following movement. No ambulation attempted today due to dizziness in standing. Recommend home w/ family at d/c. Will follow.  [place] of Care Reviewed With:   patient   spouse   Goal Outcome Evaluation:  Plan of Care Reviewed With: patient, spouse           Outcome Evaluation: 63 yo female adm 10/22/24 for abdominal pain. Recent dx of colon cancer. On 10/22, underwent total colectomy w/ davinci robot and ileostomy. PMH: relatively insignificant. 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 Lives w/  in single level function home w/ 5 stairs and handrail to enter. Today, pt is hypotensive, but her BP remains stable w/ activity. Pt has dizziness upon sitting and standing. She also had difficulty w/ postural control in sitting and standing. Needs 2 person assist for safety when changing positions. However, she is able to transfer to chair this date. Pain elevates w/ movement but is controlled following movement. No ambulation attempted today due to dizziness in standing. Recommend home w/ family at d/c. Will follow.

## 2024-10-23 NOTE — NURSING NOTE
WOCN note:    64 yr old female admitted 10/22/24 for a laparoscopic total colectomy and creation of ileostomy for colon cancer. WOCN consult received for new ostomy education.     Patient assisted to the chair by PT with spouse and daughter in law at the bedside. The ostomy pouch is leaking dark green liquid effluent. The pouch was removed and the skin was cleansed with water. The RLQ stoma is pink, moist and budded. The mucocutaneous junction is intact. There are slight creases at 9:00 and 3:00. An Adapt barrier ring was cut and placed in the creases and along the inferior border of the stoma. A Harshaw flat one piece cut to fit pouch was applied. The stoma is positioned in a soft belly fold and patient may benefit from a convex system.     Patient was able to return demonstrate how to open and close the pouch. She and her daughter in law were instructed on diet and hydration concerns, electrolyte replacement, bathing, dressing and managing odors and flatus. Written materials were left at the bedside and we will continue teaching tomorrow.

## 2024-10-23 NOTE — PROGRESS NOTES
Colorectal Surgery Progress Note    Pt. Name/Age/:  Mali Vann   64 y.o.    1960         Med. Record Number:   [id number]  Date of admission:  10/22/2024      Service(s): Colorectal Surgery      Subjective    Doing well   Mild hypotension overnight, 500cc bolus given   Reports no nausea  Pain well controlled  Drain serous   Good urine output     Hgb stable in am lab     Objective  Vitals:     Patient Vitals for the past 24 hrs:   BP Temp Temp src Pulse Resp SpO2   10/23/24 1213 103/68 98.2 °F (36.8 °C) Oral 60 16 97 %   10/23/24 0813 91/49 98.6 °F (37 °C) Oral 78 16 97 %   10/23/24 0625 94/49 -- -- -- -- --   10/23/24 0500 (!) 89/57 99 °F (37.2 °C) Axillary 68 14 96 %   10/23/24 0127 93/56 98.2 °F (36.8 °C) Oral 71 14 98 %   10/22/24 2159 -- -- Oral 74 12 94 %   10/22/24 1815 104/70 97.8 °F (36.6 °C) Oral 83 -- 96 %   10/22/24 1720 119/60 98.8 °F (37.1 °C) Oral 88 13 95 %   10/22/24 1715 124/57 -- -- 81 -- 95 %   10/22/24 1710 115/56 -- -- 87 13 94 %   10/22/24 1705 104/54 -- -- 82 11 93 %   10/22/24 1700 112/56 -- -- 75 11 96 %   10/22/24 1654 139/44 -- -- 83 10 97 %   10/22/24 1650 126/68 -- -- 81 14 96 %   10/22/24 1645 123/68 -- -- 80 13 96 %   10/22/24 1640 137/73 -- -- 84 14 97 %   10/22/24 1635 122/63 -- -- 83 14 98 %   10/22/24 1630 129/67 -- -- 80 14 98 %   10/22/24 1625 122/68 -- -- 81 15 97 %   10/22/24 1620 114/63 -- -- 81 14 95 %   10/22/24 1615 118/61 -- -- 82 12 95 %   10/22/24 1610 121/61 -- -- 79 11 95 %   10/22/24 1605 121/61 99.1 °F (37.3 °C) Oral 78 14 95 %           Wt. Admission: Weight: 99.8 kg (220 lb)     Wt. Current: Weight: 93.9 kg (207 lb)   Body mass index is 28.87 kg/m².      I&O:  Intake/Output Summary (Last 24 hours) at 10/23/2024 1215  Last data filed at 10/23/2024 1100  Gross per 24 hour   Intake 1610 ml   Output 1790 ml   Net -180 ml     10/21 190 - 10/23 0700  In: 3200 [I.V.:2600]  Out: 2440 [Urine:1000; Drains:640]      Exam  General:  No acute distress, resting  comfortably.  Cardiovascular: regular rate.  Respiratory: no increased work of breathing.  Abdomen:  Soft, appropriate incisional tenderness, non-distended. No diffuse guarding or rebound tenderness. Incisions c/d/I. Ostomy viable with bilious output   Extremities: warm, well-perfused extremities, no pitting edema.      Labs:     [unfilled]          Scheduled Meds:acetaminophen, 1,000 mg, Oral, Q6H  methocarbamol, 750 mg, Oral, 4x Daily  pregabalin, 25 mg, Oral, Q8H  sertraline, 50 mg, Oral, Daily      Continuous Infusions:   PRN Meds:.  HYDROmorphone **AND** naloxone    ketorolac    ondansetron    oxyCODONE    oxyCODONE          Assessment  64 y.o. female s/p total abdominal colectomy with end ileostomy     Plan / Recommendations    - hep lock   - general diet   - ostomy teaching and care  - minimize narcotics  - strict I and O   - vega catheter will be taken out tomorrow   - anti nausea as needed  - out of bed and ambulating as tolerated   - start DVT prophylaxis     12:15 EDT; 10/23/2024        Dickson Arizmendi MD  Colon and Rectal Surgery   [facility]

## 2024-10-23 NOTE — THERAPY EVALUATION
Patient Name: Mali Vann  : 1960    MRN: [id number]                              Today's Date: 10/23/2024       Admit Date: 10/22/2024    Visit Dx:     ICD-10-CM ICD-9-CM   1. Malignant neoplasm of ascending colon  C18.2 153.6   2. History of colon cancer  Z85.038 V10.05     Patient Active Problem List   Diagnosis    Wellness examination    Recurrent major depressive disorder, in full remission    History of colon cancer    Overweight (BMI 25.0-29.9)    Prediabetes    Dyslipidemia    Class 1 obesity due to excess calories with serious comorbidity and body mass index (BMI) of 30.0 to 30.9 in adult    Encounter for screening colonoscopy    Colon cancer     Past Medical History:   Diagnosis Date    Cancer     Depression     Dyslipidemia     Elevated cholesterol     History of colon cancer     Prediabetes 2024     Past Surgical History:   Procedure Laterality Date    CHOLECYSTECTOMY      in [place]    COLON SURGERY  1990    12-18 inches colon removed due to cancer; In [place], [place]    COLONOSCOPY      in [place], [place] Dr. Beck    COLONOSCOPY N/A 2024    Procedure: COLONOSCOPY with biopsy of cecal mass x3 areas,cold snare polypectomy x8.hot snare polypectomy x2 and tatooing x2 areas;  Surgeon: Dickson Arizmendi MD;  Location: [facility] ENDOSCOPY;  Service: General;  Laterality: N/A;  cecal mass,colon polyps,colon mass      General Information       Row Name 10/23/24 1103          Physical Therapy Time and Intention    Document Type evaluation  -CM     Mode of Treatment physical therapy  -CM       Row Name 10/23/24 1105          General Information    Patient Profile Reviewed yes  -CM     Prior Level of Function independent:;community mobility;gait;driving;shopping  -CM     Existing Precautions/Restrictions other (see comments)  abdominal incision; colostomy  -CM     Barriers to Rehab none identified  -CM       Row Name 10/23/24 110          Living Environment    People in Home spouse  -CM        Row Name 10/23/24 1104          Home Main Entrance    Number of Stairs, Main Entrance five  -CM     Stair Railings, Main Entrance railings safe and in good condition  -CM       Row Name 10/23/24 1104          Stairs Within Home, Primary    Stairs, Within Home, Primary single level function; has basement laundry but will not need to do laundry., as family is taking care of it  -CM     Number of Stairs, Within Home, Primary none  -CM       Row Name 10/23/24 1104          Cognition    Orientation Status (Cognition) oriented x 4  -CM       Row Name 10/23/24 1104          Safety Issues/Impairments Affecting Functional Mobility    Impairments Affecting Function (Mobility) endurance/activity tolerance;postural/trunk control;pain;balance  -CM               User Key  (r) = Recorded By, (t) = Taken By, (c) = Cosigned By      Initials Name Provider Type    Migdalia Groves, PT Physical Therapist                   Mobility       Row Name 10/23/24 1105          Bed Mobility    Bed Mobility rolling right;sidelying-sit  -CM     Rolling Right Hamilton (Bed Mobility) minimum assist (75% patient effort)  -CM     Sidelying-Sit [place] (Bed Mobility) minimum assist (75% patient effort)  -CM     Assistive Device (Bed Mobility) bed rails  -CM     Comment, (Bed Mobility) encouraged log rolling for abdominal sparing; instructed pt to exhale each time she moves.  -CM       Row Name 10/23/24 1105          Bed-Chair Transfer    Bed-Chair Hamilton (Transfers) moderate assist (50% patient effort);2 person assist  -CM     Assistive Device (Bed-Chair Transfers) other (see comments)  gait belt, non skid socks  -CM     Comment, (Bed-Chair Transfer) became very unsteady in standing at eob, needed 2nd person to help pt regain balance; did not stay in standing but just quickly performed stand/turn/sit to avoid orthostatic hypotension  -CM       Row Name 10/23/24 1105          Sit-Stand Transfer    Sit-Stand Hamilton (Transfers)  minimum assist (75% patient effort);1 person assist  -CM       Row Name 10/23/24 1105          Gait/Stairs (Locomotion)    Stephenson Level (Gait) unable to assess  -CM     Patient was able to Ambulate no, other medical factors prevent ambulation  -CM     Reason Patient was unable to Ambulate Hypotension  -CM               User Key  (r) = Recorded By, (t) = Taken By, (c) = Cosigned By      Initials Name Provider Type    CM Migdalia Vogt, PT Physical Therapist                   Obj/Interventions       Row Name 10/23/24 1107          Range of Motion Comprehensive    General Range of Motion no range of motion deficits identified  -CM       Row Name 10/23/24 1107          Strength Comprehensive (MMT)    General Manual Muscle Testing (MMT) Assessment no strength deficits identified  -CM     Comment, General Manual Muscle Testing (MMT) Assessment no resistance applied to hip flexors due to recent sx; has at least 3/5 strength at hips w/ no pain w/ flexion in sitting  -CM       Row Name 10/23/24 1107          Balance    Balance Assessment sitting static balance;sitting dynamic balance;standing static balance;standing dynamic balance  -CM     Static Sitting Balance contact guard;minimal assist  -CM     Dynamic Sitting Balance minimal assist;moderate assist  -CM     Position, Sitting Balance supported;sitting edge of bed  -CM     Static Standing Balance moderate assist  -CM     Dynamic Standing Balance moderate assist  -CM     Position/Device Used, Standing Balance supported  -       Row Name 10/23/24 1107          Sensory Assessment (Somatosensory)    Sensory Assessment (Somatosensory) sensation intact  -CM               User Key  (r) = Recorded By, (t) = Taken By, (c) = Cosigned By      Initials Name Provider Type    Migdalia Groves, PT Physical Therapist                   Goals/Plan       Row Name 10/23/24 1116          Bed Mobility Goal 1 (PT)    Activity/Assistive Device (Bed Mobility Goal 1, PT) bed  mobility activities, all  -CM     Maury Level/Cues Needed (Bed Mobility Goal 1, PT) modified independence  -CM     Time Frame (Bed Mobility Goal 1, PT) 2 weeks  -CM       Row Name 10/23/24 1116          Transfer Goal 1 (PT)    Activity/Assistive Device (Transfer Goal 1, PT) transfers, all  -CM     Maury Level/Cues Needed (Transfer Goal 1, PT) independent  -CM     Time Frame (Transfer Goal 1, PT) 2 weeks  -CM       Row Name 10/23/24 1116          Gait Training Goal 1 (PT)    Activity/Assistive Device (Gait Training Goal 1, PT) gait (walking locomotion)  -CM     Maury Level (Gait Training Goal 1, PT) independent  -CM     Distance (Gait Training Goal 1, PT) 400 ft  -CM     Time Frame (Gait Training Goal 1, PT) 2 weeks  -CM       Row Name 10/23/24 1116          Balance Goal 1 (PT)    Activity/Assistive Device (Balance Goal) sitting static balance;sitting dynamic balance;standing static balance;standing dynamic balance  -CM     Maury Level/Cues Needed (Balance Goal 1, PT) independent  -CM     Time Frame (Balance Goal 1, PT) 2 weeks  -CM       Row Name 10/23/24 1116          Stairs Goal 1 (PT)    Activity/Assistive Device (Stairs Goal 1, PT) stairs, all skills  -CM     Maury Level/Cues Needed (Stairs Goal 1, PT) supervision required  -CM     Number of Stairs (Stairs Goal 1, PT) 5  -CM     Time Frame (Stairs Goal 1, PT) 2 weeks  -CM       Row Name 10/23/24 1116          Therapy Assessment/Plan (PT)    Planned Therapy Interventions (PT) balance training;bed mobility training;gait training;home exercise program;patient/family education;postural re-education;strengthening;stair training;ROM (range of motion);transfer training  -CM               User Key  (r) = Recorded By, (t) = Taken By, (c) = Cosigned By      Initials Name Provider Type    Migdalia Groves, PT Physical Therapist                   Clinical Impression       Row Name 10/23/24 1102          Pain    Pretreatment Pain Rating  2/10  at rest  -CM     Posttreatment Pain Rating 4/10  after movement; pain was > 4/10 w/ movement but pt never gave numerical rating  -CM     Pain Location abdomen  -CM     Pain Management Interventions breathing exercises utilized;exercise or physical activity utilized;nursing notified  -CM     Response to Pain Interventions intervention effective per patient report  -CM       Row Name 10/23/24 1114 10/23/24 1108       Plan of Care Review    Plan of Care Reviewed With -- patient;spouse  -CM    Outcome Evaluation 63 yo female adm 10/22/24 for abdominal pain. Recent dx of colon cancer. On 10/22, underwent total colectomy w/ davinci robot and ileostomy. PMH: relatively insignificant. 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 Lives w/  in single level function home w/ 5 stairs and handrail to enter. Today, pt is hypotensive, but her BP remains stable w/ activity. Pt has dizziness upon sitting and standing. She also had difficulty w/ postural control in sitting and standing. Needs 2 person assist for safety when changing positions. However, she is able to transfer to chair this date. Pain elevates w/ movement but is controlled following movement. No ambulation attempted today due to dizziness in standing. Recommend home w/ family at d/c. Will follow.  -CM 63 yo female adm 10/22/24 for abdominal pain. Recent dx of colon cancer. On 10/22, underwent total colectomy w/ davinci robot and ileostomy. PMH:  -CM      Row Name 10/23/24 1114 10/23/24 1111       Therapy Assessment/Plan (PT)    Patient/Family Therapy Goals Statement (PT) -- 63 yo female adm 10/22/24 for abdominal pain. Recent dx of colon cancer. On 10/22, underwent total colectomy w/ davinci robot and ileostomy. PMH: relatively insignificant. 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 Lives w/  in single level function home w/ 5 stairs  and handrail to enter. Today, pt is hypotensive, but her BP remains stable w/ activity. Pt has dizziness upon sitting and standing. She also had difficulty w/ postural control in sitting and standing. Needs 2 person assist for safety when changing positions. However, she is able to transfer to chair this date. Pain elevates w/ movement but is controlled following movement. No ambulation attempted today due to dizziness in standing. Recommend home w/ family at d/c. Will follow.  -CM    Rehab Potential (PT) good  -CM --    Criteria for Skilled Interventions Met (PT) yes;meets criteria;skilled treatment is necessary  -CM --    Therapy Frequency (PT) 5 times/wk  -CM --    Predicted Duration of Therapy Intervention (PT) until d/c or goals met.  -CM --      Row Name 10/23/24 1114          Vital Signs    Pre Systolic BP Rehab 101  -CM     Pre Treatment Diastolic BP 60  -CM     Intra Systolic BP Rehab 108  -CM     Intra Treatment Diastolic BP 69  -CM     Post Systolic BP Rehab 115  -CM     Post Treatment Diastolic BP 56  -CM     Pretreatment Heart Rate (beats/min) 63  -CM     Intratreatment Heart Rate (beats/min) 69  -CM     Posttreatment Heart Rate (beats/min) 61  -CM     Pre SpO2 (%) 96  -CM     O2 Delivery Pre Treatment supplemental O2  2L  -CM     Intra SpO2 (%) 91  -CM     O2 Delivery Intra Treatment room air  -CM     Post SpO2 (%) 99  -CM     O2 Delivery Post Treatment supplemental O2  2L  -CM       Row Name 10/23/24 1114          Positioning and Restraints    Pre-Treatment Position in bed  -CM     Post Treatment Position chair  -CM     In Chair notified nsg;reclined;call light within reach;with family/caregiver;encouraged to call for assist;with other staff;legs elevated  wound care RN changing ostomy bag  -CM               User Key  (r) = Recorded By, (t) = Taken By, (c) = Cosigned By      Initials Name Provider Type    Migdalia Groves, PT Physical Therapist                   Outcome Measures       Row Name  10/23/24 1118          How much help from another person do you currently need...    Turning from your back to your side while in flat bed without using bedrails? 3  -CM     Moving from lying on back to sitting on the side of a flat bed without bedrails? 3  -CM     Moving to and from a bed to a chair (including a wheelchair)? 2  -CM     Standing up from a chair using your arms (e.g., wheelchair, bedside chair)? 2  -CM     Climbing 3-5 steps with a railing? 1  -CM     To walk in hospital room? 1  -CM     AM-PAC 6 Clicks Score (PT) 12  -CM               User Key  (r) = Recorded By, (t) = Taken By, (c) = Cosigned By      Initials Name Provider Type    Migdalia Groves, PT Physical Therapist                                 Physical Therapy Education       Title: PT OT SLP Therapies (Done)       Topic: Physical Therapy (Done)       Point: Mobility training (Done)       Learning Progress Summary            Patient Acceptance, E,TB, VU by CM at 10/23/2024 1118   Family Acceptance, E,TB, VU by CM at 10/23/2024 1118                      Point: Home exercise program (Done)       Learning Progress Summary            Patient Acceptance, E,TB, VU by CM at 10/23/2024 1118   Family Acceptance, E,TB, VU by CM at 10/23/2024 1118                      Point: Body mechanics (Done)       Learning Progress Summary            Patient Acceptance, E,TB, VU by CM at 10/23/2024 1118   Family Acceptance, E,TB, VU by CM at 10/23/2024 1118                      Point: Precautions (Done)       Learning Progress Summary            Patient Acceptance, E,TB, VU by CM at 10/23/2024 1118   Family Acceptance, E,TB, VU by CM at 10/23/2024 1118                                      User Key       Initials Effective Dates Name Provider Type Discipline    JAMAL 06/16/21 -  Migdalia Vogt, PT Physical Therapist PT                  PT Recommendation and Plan  Planned Therapy Interventions (PT): balance training, bed mobility training, gait training, home  exercise program, patient/family education, postural re-education, strengthening, stair training, ROM (range of motion), transfer training  Outcome Evaluation: 63 yo female adm 10/22/24 for abdominal pain. Recent dx of colon cancer. On 10/22, underwent total colectomy w/ davinci robot and ileostomy. PMH: relatively insignificant. 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 Lives w/  in single level function home w/ 5 stairs and handrail to enter. Today, pt is hypotensive, but her BP remains stable w/ activity. Pt has dizziness upon sitting and standing. She also had difficulty w/ postural control in sitting and standing. Needs 2 person assist for safety when changing positions. However, she is able to transfer to chair this date. Pain elevates w/ movement but is controlled following movement. No ambulation attempted today due to dizziness in standing. Recommend home w/ family at d/c. Will follow.     Time Calculation:         PT Charges       Row Name 10/23/24 1118 10/23/24 0914          Time Calculation    Start Time 1025  -CM --     Stop Time 1056  -CM --     Time Calculation (min) 31 min  -CM --     PT Received On 10/23/24  -CM --     PT - Next Appointment 10/24/24  -CM 10/24/24  -CM     PT Goal Re-Cert Due Date 11/06/24  -CM --        Time Calculation- PT    Total Timed Code Minutes- PT 0 minute(s)  -CM --               User Key  (r) = Recorded By, (t) = Taken By, (c) = Cosigned By      Initials Name Provider Type    Migdalia Groves, PT Physical Therapist                  Therapy Charges for Today       Code Description Service Date Service Provider Modifiers Qty    [id number]  PT EVAL MOD COMPLEXITY 4 10/23/2024 Migdalia Vogt, PT GP 1            PT G-Codes  AM-PAC 6 Clicks Score (PT): 12  PT Discharge Summary  Anticipated Discharge Disposition (PT): home with assist    Migdalia Vogt PT  10/23/2024

## 2024-10-23 NOTE — CASE MANAGEMENT/SOCIAL WORK
Discharge Planning Assessment  [facility]     Patient Name: Mali Vann  MRN: [id number]  Today's Date: 10/23/2024    Admit Date: 10/22/2024    Plan: DC PLAN: Routine home       Discharge Needs Assessment       Row Name 10/23/24 1356       Living Environment    People in Home spouse    Current Living Arrangements home    Potentially Unsafe Housing Conditions none    In the past 12 months has the electric, gas, oil, or water company threatened to shut off services in your home? No    Primary Care Provided by self    Provides Primary Care For no one    Family Caregiver if Needed spouse    Quality of Family Relationships helpful;involved;supportive    Able to Return to Prior Arrangements yes       Resource/Environmental Concerns    Resource/Environmental Concerns none    Transportation Concerns none       Transportation Needs    In the past 12 months, has lack of transportation kept you from medical appointments or from getting medications? no    In the past 12 months, has lack of transportation kept you from meetings, work, or from getting things needed for daily living? No       Food Insecurity    Within the past 12 months, you worried that your food would run out before you got the money to buy more. Never true    Within the past 12 months, the food you bought just didn't last and you didn't have money to get more. Never true       Transition Planning    Patient/Family Anticipates Transition to home with family    Patient/Family Anticipated Services at Transition none    Transportation Anticipated car, drives self;family or friend will provide       Discharge Needs Assessment    Readmission Within the Last 30 Days no previous admission in last 30 days    Equipment Currently Used at Home none    Anticipated Changes Related to Illness none    Equipment Needed After Discharge none                   Discharge Plan       Row Name 10/23/24 1356       Plan    Plan DC PLAN: Routine home    Patient/Family in Agreement with  Plan yes    Plan Comments CM Met with patient at bedside, from routine home with . Independent with ADL's no DME. PCP is Hasmukh, Pharmacy is [facility] in [place]. Able to afford medications and denies any issues with food or utilities. Denies any transportation issues, still drives and  will provide. Denies any HHC or SNF needs. Denies any concerns about return home. NAHOMI drain X 2, Ashby, IV pain meds.                  Continued Care and Services - Admitted Since 10/22/2024    No active coordination exists for this encounter.       Expected Discharge Date and Time       Expected Discharge Date Expected Discharge Time    Oct 24, 2024            Demographic Summary       Row Name 10/23/24 1356       General Information    Admission Type inpatient    Arrived From emergency department    Required Notices Provided Important Message from Medicare    Referral Source admission list    Reason for Consult discharge planning    Preferred Language English       Contact Information    Permission Granted to Share Info With     Contact Information Obtained for                    Functional Status       Row Name 10/23/24 1356       Functional Status    Usual Activity Tolerance excellent    Current Activity Tolerance excellent       Physical Activity    On average, how many days per week do you engage in moderate to strenuous exercise (like a brisk walk)? 0 days    On average, how many minutes do you engage in exercise at this level? 0 min    Number of minutes of exercise per week 0       Assessment of Health Literacy    How often do you have someone help you read hospital materials? Sometimes    How often do you have problems learning about your medical condition because of difficulty understanding written information? Sometimes    How often do you have a problem understanding what is told to you about your medical condition? Sometimes    How confident are you filling out medical forms by  yourself? Somewhat    Health Literacy Moderate       Functional Status, IADL    Medications independent    Meal Preparation independent    Housekeeping independent    Laundry independent    Shopping independent       Mental Status    General Appearance WDL WDL       Mental Status Summary    Recent Changes in Mental Status/Cognitive Functioning no changes                         Met with patient in room wearing PPE:     Maintained distance greater than six feet and spent less than 15 minutes in the room.    Carly Moreno RN   Case Management  [phone]

## 2024-10-23 NOTE — SIGNIFICANT NOTE
10/23/24 0914   OTHER   Discipline physical therapist   Rehab Time/Intention   Session Not Performed unable to evaluate, medical status change;other (see comments)  (RN reports pt is too hypotensive and nearly became syncopal w/ attempt to stand a few minutes ago. Asks to have PT check back later in day.)   Recommendation   PT - Next Appointment 10/24/24

## 2024-10-23 NOTE — PLAN OF CARE
Goal Outcome Evaluation:   Pain controlled with PO and IV pain meds. Drainage present on dressings, area marked. Pt did not ambulate during beginning of shift due to lethargy. Family at bedside, call light within reach, plan of care on going.

## 2024-10-24 LAB
LAB AP CASE REPORT: NORMAL
LAB AP DIAGNOSIS COMMENT: NORMAL
LAB AP SYNOPTIC CHECKLIST: NORMAL
PATH REPORT.FINAL DX SPEC: NORMAL
PATH REPORT.GROSS SPEC: NORMAL

## 2024-10-24 PROCEDURE — 25010000002 HEPARIN (PORCINE) PER 1000 UNITS: Performed by: STUDENT IN AN ORGANIZED HEALTH CARE EDUCATION/TRAINING PROGRAM

## 2024-10-24 PROCEDURE — 97116 GAIT TRAINING THERAPY: CPT

## 2024-10-24 PROCEDURE — 97530 THERAPEUTIC ACTIVITIES: CPT

## 2024-10-24 PROCEDURE — 25010000002 KETOROLAC TROMETHAMINE PER 15 MG: Performed by: STUDENT IN AN ORGANIZED HEALTH CARE EDUCATION/TRAINING PROGRAM

## 2024-10-24 PROCEDURE — 99024 POSTOP FOLLOW-UP VISIT: CPT | Performed by: STUDENT IN AN ORGANIZED HEALTH CARE EDUCATION/TRAINING PROGRAM

## 2024-10-24 PROCEDURE — 25010000002 ONDANSETRON PER 1 MG: Performed by: STUDENT IN AN ORGANIZED HEALTH CARE EDUCATION/TRAINING PROGRAM

## 2024-10-24 RX ADMIN — HEPARIN SODIUM 5000 UNITS: 5000 INJECTION INTRAVENOUS; SUBCUTANEOUS at 04:40

## 2024-10-24 RX ADMIN — ONDANSETRON 4 MG: 2 INJECTION, SOLUTION INTRAMUSCULAR; INTRAVENOUS at 11:16

## 2024-10-24 RX ADMIN — METHOCARBAMOL 750 MG: 750 TABLET ORAL at 17:19

## 2024-10-24 RX ADMIN — SERTRALINE 50 MG: 50 TABLET, FILM COATED ORAL at 08:21

## 2024-10-24 RX ADMIN — OXYCODONE 5 MG: 5 TABLET ORAL at 06:46

## 2024-10-24 RX ADMIN — PREGABALIN 25 MG: 25 CAPSULE ORAL at 04:40

## 2024-10-24 RX ADMIN — ONDANSETRON 4 MG: 2 INJECTION, SOLUTION INTRAMUSCULAR; INTRAVENOUS at 20:23

## 2024-10-24 RX ADMIN — KETOROLAC TROMETHAMINE 15 MG: 30 INJECTION, SOLUTION INTRAMUSCULAR at 09:50

## 2024-10-24 RX ADMIN — OXYCODONE 5 MG: 5 TABLET ORAL at 21:39

## 2024-10-24 RX ADMIN — PREGABALIN 25 MG: 25 CAPSULE ORAL at 15:36

## 2024-10-24 RX ADMIN — METHOCARBAMOL 750 MG: 750 TABLET ORAL at 08:21

## 2024-10-24 RX ADMIN — HEPARIN SODIUM 5000 UNITS: 5000 INJECTION INTRAVENOUS; SUBCUTANEOUS at 21:39

## 2024-10-24 RX ADMIN — ACETAMINOPHEN 1000 MG: 500 TABLET, FILM COATED ORAL at 04:40

## 2024-10-24 RX ADMIN — HEPARIN SODIUM 5000 UNITS: 5000 INJECTION INTRAVENOUS; SUBCUTANEOUS at 15:36

## 2024-10-24 RX ADMIN — ACETAMINOPHEN 1000 MG: 500 TABLET, FILM COATED ORAL at 17:19

## 2024-10-24 RX ADMIN — ACETAMINOPHEN 1000 MG: 500 TABLET, FILM COATED ORAL at 21:38

## 2024-10-24 RX ADMIN — PREGABALIN 25 MG: 25 CAPSULE ORAL at 21:39

## 2024-10-24 NOTE — THERAPY TREATMENT NOTE
"Subjective: Pt agreeable to therapeutic plan of care.    Objective:      Precautions - moderate falls risk; ostomy in place; lisa drain x 2    Bed mobility - Min-A  Transfers - Min-A and with rolling walker  Ambulation - 80 feet Min-A and with rolling walker    Vitals: WNL  BP in supine 130/66  Sitting 136/64  Standing 123/72    Pain: 2 VAS   Location: kabdominal pressure  Intervention for pain: Repositioned, RN provided medication, RN notified, Increased Activity, and Therapeutic Presence    Education: Provided education on the importance of mobility in the acute care setting, Verbal/Tactile Cues, Transfer Training, Gait Training, Energy conservation strategies, and Post-Op Precautions    Assessment: Mali Vann is improving in her tolerance for activity. She still moves very slowly and is somewhat lethargic in her presentation. Needs encouragement to open eyes while walking and to move more actively. Had pt sit in chair at end of rx and instructed her to be up for ALL meals in chair. Pt presents with functional mobility impairments which indicate the need for skilled intervention. Tolerating session today without incident. Will continue to follow and progress as tolerated.     Plan/Recommendations:   If medically appropriate, Low Intensity Therapy recommended post-acute care - This is recommended as therapy feels this patient would require 2-3 visits per week. OP or HH would be the best option depending on patient's home bound status. Consider, if the patient has other  \"skilled\" needs such as wounds, IV antibiotics, etc. Combined with \"low intensity\" could also equate to a SNF. If patient is medically complex, consider LTAC. Pt requires no DME at discharge.     Pt desires Home with family assist at discharge. Pt cooperative; agreeable to therapeutic recommendations and plan of care.         Basic Mobility 6-click:  Rollin = Total, A lot = 2, A little = 3; 4 = None  Supine>Sit:   1 = Total, A lot = 2, A " little = 3; 4 = None   Sit>Stand with arms:  1 = Total, A lot = 2, A little = 3; 4 = None  Bed>Chair:   1 = Total, A lot = 2, A little = 3; 4 = None  Ambulate in room:  1 = Total, A lot = 2, A little = 3; 4 = None  3-5 Steps with railin = Total, A lot = 2, A little = 3; 4 = None  Score: 18    Modified [place]: N/A = No pre-op stroke/TIA    Post-Tx Position: Up in Chair and Call light and personal items within reach;  and adult son present  PPE: gloves    Therapy Charges for Today       Code Description Service Date Service Provider Modifiers Qty    25213585374 HC PT EVAL MOD COMPLEXITY 4 10/23/2024 Migdalia Vogt, PT GP 1    [id number]  GAIT TRAINING EA 15 MIN 10/24/2024 Migdalia Vogt, PT GP 1    [id number]  PT THERAPEUTIC ACT EA 15 MIN 10/24/2024 Migdalia Vogt, PT GP 1

## 2024-10-24 NOTE — PLAN OF CARE
"Goal Outcome Evaluation:    Assessment: Mali Vann is improving in her tolerance for activity. She still moves very slowly and is somewhat lethargic in her presentation. Needs encouragement to open eyes while walking and to move more actively. Had pt sit in chair at end of rx and instructed her to be up for ALL meals in chair. Pt presents with functional mobility impairments which indicate the need for skilled intervention. Tolerating session today without incident. Will continue to follow and progress as tolerated.     Plan/Recommendations:   If medically appropriate, Low Intensity Therapy recommended post-acute care - This is recommended as therapy feels this patient would require 2-3 visits per week. OP or HH would be the best option depending on patient's home bound status. Consider, if the patient has other  \"skilled\" needs such as wounds, IV antibiotics, etc. Combined with \"low intensity\" could also equate to a SNF. If patient is medically complex, consider LTAC. Pt requires no DME at discharge.     Pt desires Home with family assist at discharge. Pt cooperative; agreeable to therapeutic recommendations and plan of care.       Anticipated Discharge Disposition (PT): home with assist                        "

## 2024-10-24 NOTE — PLAN OF CARE
Problem: Adult Inpatient Plan of Care  Goal: Absence of Hospital-Acquired Illness or Injury  Outcome: Progressing  Intervention: Identify and Manage Fall Risk  Recent Flowsheet Documentation  Taken 10/24/2024 1000 by Elva Gutierrez RN  Safety Promotion/Fall Prevention:   safety round/check completed   fall prevention program maintained  Taken 10/24/2024 0800 by Elva Gutierrez RN  Safety Promotion/Fall Prevention:   safety round/check completed   fall prevention program maintained  Intervention: Prevent and Manage VTE (Venous Thromboembolism) Risk  Recent Flowsheet Documentation  Taken 10/24/2024 0800 by Elva Gutierrez RN  VTE Prevention/Management:   bilateral   SCDs (sequential compression devices) on   Goal Outcome Evaluation:  Plan of Care Reviewed With: patient        Progress: improving

## 2024-10-24 NOTE — PLAN OF CARE
Goal Outcome Evaluation:      Pain controlled with PO pain meds. Ostomy producing output. Left drain site dressing had to be reinforced.  at bedside, call light within reach, plan of care on going.

## 2024-10-24 NOTE — PROGRESS NOTES
Colorectal Surgery Progress Note    Pt. Name/Age/:  Mali Vann   64 y.o.    1960         Med. Record Number:   [id number]  Date of admission:  10/22/2024      Service(s): Colorectal Surgery      Subjective    Doing well   Reports no nausea  Pain well controlled  Drain serous   Good urine output, vega was removed     Normotensive and afebrile     Path result discussed with family     Objective  Vitals:     Patient Vitals for the past 24 hrs:   BP Temp Temp src Pulse Resp SpO2   10/24/24 1130 131/77 98.3 °F (36.8 °C) Oral 70 18 95 %   10/24/24 0405 115/62 98.4 °F (36.9 °C) Oral 65 14 93 %   10/23/24 2111 113/59 98.4 °F (36.9 °C) Oral 68 17 91 %           Wt. Admission: Weight: 99.8 kg (220 lb)     Wt. Current: Weight: 93.9 kg (207 lb)   Body mass index is 28.87 kg/m².      I&O:  Intake/Output Summary (Last 24 hours) at 10/24/2024 1745  Last data filed at 10/24/2024 1720  Gross per 24 hour   Intake 540 ml   Output 1640 ml   Net -1100 ml     10/22 1901 - 10/24 0700  In: 360 [P.O.:360]  Out: 2230 [Urine:1500; Drains:730]      Exam  General:  No acute distress, resting comfortably.  Cardiovascular: regular rate.  Respiratory: no increased work of breathing.  Abdomen:  Soft, appropriate incisional tenderness, non-distended. No diffuse guarding or rebound tenderness. Incisions c/d/I. Ostomy viable with bilious output   Extremities: warm, well-perfused extremities, no pitting edema.      Labs:     [unfilled]          Scheduled Meds:acetaminophen, 1,000 mg, Oral, Q6H  heparin (porcine), 5,000 Units, Subcutaneous, Q8H  methocarbamol, 750 mg, Oral, 4x Daily  pregabalin, 25 mg, Oral, Q8H  sertraline, 50 mg, Oral, Daily      Continuous Infusions:   PRN Meds:.  HYDROmorphone **AND** naloxone    ketorolac    ondansetron    oxyCODONE    oxyCODONE          Assessment  64 y.o. female s/p total abdominal colectomy with end ileostomy     Plan / Recommendations    - Discussed pathology result which shows pT3pN0.   - Case  discussed with Dr Jameson for further discussion regarding further treatment   - hep lock   - general diet   - ostomy teaching and care  - minimize narcotics  - strict I and O   - void check  - anti nausea as needed  - out of bed and ambulating as tolerated   - start DVT prophylaxis     17:45 EDT; 10/24/2024        Dickson Arizmendi MD  Colon and Rectal Surgery   [facility]

## 2024-10-24 NOTE — NURSING NOTE
WOCN note:    64 yr old female admitted 10/22/24 for a laparoscopic total colectomy and creation of ileostomy for colon cancer. WOCN follow up for ongoing ostomy education.     Patient in bed and dozing after receiving Zofran for nausea. Spouse is at the bedside. RLQ ostomy dusky in color today with pouch intact with dark green liquid effluent.     Education provided to the spouse on food lists for thickening stool, avoiding stoma blockage and gas producing foods. Instructed on supply ordering process.  Spouse reports that insurance is provided by the [employer] but does not include prescriptions. He plans to call to inquire about DME/ ostomy supplies.   Instructed on post discharge care and contact information given for the outpatient ostomy clinic. Supplies were left at the bedside. Will continue to follow for teaching needs.

## 2024-10-25 PROCEDURE — 25010000002 ONDANSETRON PER 1 MG: Performed by: STUDENT IN AN ORGANIZED HEALTH CARE EDUCATION/TRAINING PROGRAM

## 2024-10-25 PROCEDURE — 99024 POSTOP FOLLOW-UP VISIT: CPT | Performed by: STUDENT IN AN ORGANIZED HEALTH CARE EDUCATION/TRAINING PROGRAM

## 2024-10-25 PROCEDURE — 97530 THERAPEUTIC ACTIVITIES: CPT

## 2024-10-25 PROCEDURE — 97116 GAIT TRAINING THERAPY: CPT

## 2024-10-25 PROCEDURE — 99253 IP/OBS CNSLTJ NEW/EST LOW 45: CPT | Performed by: INTERNAL MEDICINE

## 2024-10-25 PROCEDURE — 25010000002 HEPARIN (PORCINE) PER 1000 UNITS: Performed by: STUDENT IN AN ORGANIZED HEALTH CARE EDUCATION/TRAINING PROGRAM

## 2024-10-25 PROCEDURE — 97110 THERAPEUTIC EXERCISES: CPT

## 2024-10-25 RX ORDER — FAMOTIDINE 10 MG/ML
20 INJECTION, SOLUTION INTRAVENOUS DAILY
Status: DISCONTINUED | OUTPATIENT
Start: 2024-10-26 | End: 2024-10-26 | Stop reason: HOSPADM

## 2024-10-25 RX ORDER — LOPERAMIDE HCL 2 MG
2 CAPSULE ORAL 4 TIMES DAILY PRN
Status: DISCONTINUED | OUTPATIENT
Start: 2024-10-25 | End: 2024-10-25

## 2024-10-25 RX ORDER — FAMOTIDINE 10 MG/ML
20 INJECTION, SOLUTION INTRAVENOUS EVERY 12 HOURS SCHEDULED
Status: DISCONTINUED | OUTPATIENT
Start: 2024-10-25 | End: 2024-10-25

## 2024-10-25 RX ORDER — LOPERAMIDE HCL 2 MG
2 CAPSULE ORAL 3 TIMES DAILY
Status: DISCONTINUED | OUTPATIENT
Start: 2024-10-25 | End: 2024-10-26 | Stop reason: HOSPADM

## 2024-10-25 RX ADMIN — PREGABALIN 25 MG: 25 CAPSULE ORAL at 15:20

## 2024-10-25 RX ADMIN — METHOCARBAMOL 750 MG: 750 TABLET ORAL at 11:37

## 2024-10-25 RX ADMIN — ACETAMINOPHEN 1000 MG: 500 TABLET, FILM COATED ORAL at 11:37

## 2024-10-25 RX ADMIN — SERTRALINE 50 MG: 50 TABLET, FILM COATED ORAL at 08:01

## 2024-10-25 RX ADMIN — HEPARIN SODIUM 5000 UNITS: 5000 INJECTION INTRAVENOUS; SUBCUTANEOUS at 04:57

## 2024-10-25 RX ADMIN — ACETAMINOPHEN 1000 MG: 500 TABLET, FILM COATED ORAL at 17:53

## 2024-10-25 RX ADMIN — PREGABALIN 25 MG: 25 CAPSULE ORAL at 04:58

## 2024-10-25 RX ADMIN — METHOCARBAMOL 750 MG: 750 TABLET ORAL at 07:58

## 2024-10-25 RX ADMIN — METHOCARBAMOL 750 MG: 750 TABLET ORAL at 17:53

## 2024-10-25 RX ADMIN — PREGABALIN 25 MG: 25 CAPSULE ORAL at 21:39

## 2024-10-25 RX ADMIN — ONDANSETRON 4 MG: 2 INJECTION, SOLUTION INTRAMUSCULAR; INTRAVENOUS at 17:54

## 2024-10-25 RX ADMIN — AVOBENZONE, HOMOSALATE, OCTISALATE, OCTOCRYLENE, AND OXYBENZONE 1 PACKET: 29.4; 147; 49; 25.4; 58.8 LOTION TOPICAL at 17:53

## 2024-10-25 RX ADMIN — LOPERAMIDE HYDROCHLORIDE 2 MG: 2 CAPSULE ORAL at 21:39

## 2024-10-25 RX ADMIN — OXYCODONE 10 MG: 5 TABLET ORAL at 21:38

## 2024-10-25 RX ADMIN — ACETAMINOPHEN 1000 MG: 500 TABLET, FILM COATED ORAL at 04:57

## 2024-10-25 RX ADMIN — HEPARIN SODIUM 5000 UNITS: 5000 INJECTION INTRAVENOUS; SUBCUTANEOUS at 15:20

## 2024-10-25 RX ADMIN — HEPARIN SODIUM 5000 UNITS: 5000 INJECTION INTRAVENOUS; SUBCUTANEOUS at 21:39

## 2024-10-25 NOTE — PROGRESS NOTES
Colorectal Surgery Progress Note    Pt. Name/Age/:  Mali Vann   64 y.o.    1960         Med. Record Number:   [id number]  Date of admission:  10/22/2024      Service(s): Colorectal Surgery      Subjective    Doing well   Reports no nausea  Pain well controlled  Drain serous   Good urine output, voiding freely   Ostomy functioning     Normotensive and afebrile       Objective  Vitals:     Patient Vitals for the past 24 hrs:   BP Temp Temp src Pulse Resp SpO2   10/25/24 1237 105/69 98 °F (36.7 °C) Oral 72 20 93 %   10/25/24 0408 111/68 98.1 °F (36.7 °C) Oral 70 20 92 %   10/24/24 2109 114/61 98.1 °F (36.7 °C) Oral 71 19 93 %           Wt. Admission: Weight: 99.8 kg (220 lb)     Wt. Current: Weight: 93.9 kg (207 lb)   Body mass index is 28.87 kg/m².      I&O:  Intake/Output Summary (Last 24 hours) at 10/25/2024 1741  Last data filed at 10/25/2024 1700  Gross per 24 hour   Intake 720 ml   Output 1310 ml   Net -590 ml     10/23 1901 - 10/25 0700  In: 780 [P.O.:780]  Out: 1855 [Urine:1000; Drains:405]      Exam  General:  No acute distress, resting comfortably.  Cardiovascular: regular rate.  Respiratory: no increased work of breathing.  Abdomen:  Soft, appropriate incisional tenderness, non-distended. No diffuse guarding or rebound tenderness. Incisions c/d/I. Ostomy viable with bilious output   Extremities: warm, well-perfused extremities, no pitting edema.      Labs:     [unfilled]          Scheduled Meds:acetaminophen, 1,000 mg, Oral, Q6H  heparin (porcine), 5,000 Units, Subcutaneous, Q8H  loperamide, 2 mg, Oral, TID  methocarbamol, 750 mg, Oral, 4x Daily  pregabalin, 25 mg, Oral, Q8H  Psyllium, 1 packet, Oral, Daily  sertraline, 50 mg, Oral, Daily      Continuous Infusions:   PRN Meds:.  HYDROmorphone **AND** naloxone    ondansetron    oxyCODONE    oxyCODONE          Assessment  64 y.o. female s/p total abdominal colectomy with end ileostomy     Plan / Recommendations    - Discussed pathology result which  shows pT3pN0.   - will start metamucil and imodium   - concerned she might dehydrate, will place picc line and arrange IVF as outpatient  - hep lock   - general diet   - ostomy teaching and care  - minimize narcotics  - strict I and O   - void check  - anti nausea as needed  - out of bed and ambulating as tolerated   - start DVT prophylaxis   - plan for discharge tomorrow     17:41 EDT; 10/25/2024        Dickson Arizmendi MD  Colon and Rectal Surgery   [facility]

## 2024-10-25 NOTE — THERAPY TREATMENT NOTE
"Subjective: Pt agreeable to therapeutic plan of care.    Objective:     Bed mobility - Sitting to supine SBA    Transfers - Supervision and with rolling walker    Ambulation - 80 fee + 100 feet Supervision and with rolling walker. Decreased gabrielle.     Stairs - 4 steps CGA using B handrails.     Therapeutic Exercise - 20 Reps B LE AROM supported sitting / chair    Vitals: WNL    Pain: 6 VAS   Location: \"burning\" sensation near ostomy wound  Intervention for pain: RN notified    Education: Provided education on the importance of mobility in the acute care setting, Verbal/Tactile Cues, Transfer Training, and Gait Training    Assessment: Mali Vann presents with functional mobility impairments which indicate the need for skilled intervention. Tolerating session today without incident. Will continue to follow and progress as tolerated.     Plan/Recommendations:   If medically appropriate, No ongoing therapy recommended post-acute care. No therapy needs. Pt requires no DME at discharge.     Pt desires Home with family assist at discharge. Pt cooperative; agreeable to therapeutic recommendations and plan of care.         Basic Mobility 6-click:  Rollin = Total, A lot = 2, A little = 3; 4 = None  Supine>Sit:   1 = Total, A lot = 2, A little = 3; 4 = None   Sit>Stand with arms:  1 = Total, A lot = 2, A little = 3; 4 = None  Bed>Chair:   1 = Total, A lot = 2, A little = 3; 4 = None  Ambulate in room:  1 = Total, A lot = 2, A little = 3; 4 = None  3-5 Steps with railin = Total, A lot = 2, A little = 3; 4 = None  Score: 24    Modified [place]: N/A = No pre-op stroke/TIA    Post-Tx Position: Supine with HOB Elevated, Alarms activated, and Call light and personal items within reach  PPE: gloves        "

## 2024-10-25 NOTE — PLAN OF CARE
Goal Outcome Evaluation:   Pain controlled with tylenol and PO pain meds. Ambulating to and from bathroom with stand by assist and walker, tolerating well. Ostomy producing adequate output. Family at bedside. Call light within reach, plan of care on going.

## 2024-10-25 NOTE — PLAN OF CARE
Goal Outcome Evaluation:      Patient remains unengaged during PT and ostomy education. Highly HIGHLY encouraged to participate in ostomy emptying.  and patient educated at bedside by this nurse. No further questions at this time. Pain controlled. Plan of care ongoing. Referral to . Needs picc line at [place] for 1x week fluid admin. Plan of care ongoing.

## 2024-10-25 NOTE — CONSULTS
Hematology/Oncology Inpatient Consultation    Patient name: Mali Vann  : 1960  MRN: [id number]  Referring Provider: Dr. Arizmendi  Reason for Consultation: Colon cancer    Chief complaint: Admission for total colectomy    History of present illness:    Mali Vann is a 64 y.o. female who presented to [facility] on 10/22/2024 for a scheduled minimally invasive total colectomy with ileorectal anastomosis.  She underwent the procedure on 10/22/2024 due to an outpatient colonoscopy which revealed a cecal mass and a sigmoid mass.  The cecal mass was consistent with adenocarcinoma and the sigmoid mass showed high-grade dysplasia.  Her postoperative course has been uneventful.    10/25/24  Hematology/Oncology was consulted for a newly diagnosed colon adenocarcinoma in a patient with a history of colon cancer.  Patient was initially diagnosed with colon cancer in the late 1980s and was status post resection with adjuvant chemotherapy and radiation.  The patient will need genetic testing.    He/She  has a past medical history of Cancer, Depression, Dyslipidemia, Elevated cholesterol, History of colon cancer, and Prediabetes (2024).    PCP: Hui Noe APRN    History:  Past Medical History:   Diagnosis Date    Cancer     Depression     Dyslipidemia     Elevated cholesterol     History of colon cancer     Prediabetes 2024   ,   Past Surgical History:   Procedure Laterality Date    CHOLECYSTECTOMY      in [place]    COLON RESECTION N/A 10/22/2024    Procedure: LAPAROSCOPIC TOTAL COLECTOMY WITH DAVINCI ROBOT AND ILEOSTOMY;  Surgeon: Dickson Arizmendi MD;  Location: [facility];  Service: Robotics - DaVinci;  Laterality: N/A;    COLON SURGERY      12-18 inches colon removed due to cancer; In [place]    COLONOSCOPY      in [place], [place] Dr. Beck    COLONOSCOPY N/A 2024    Procedure: COLONOSCOPY with biopsy of cecal mass x3 areas,cold snare polypectomy x8.hot snare  polypectomy x2 and tatooing x2 areas;  Surgeon: Dickson Arizmendi MD;  Location: [facility];  Service: General;  Laterality: N/A;  cecal mass,colon polyps,colon mass    CYSTOSCOPY N/A 10/22/2024    Procedure: CYSTOSCOPY;  Surgeon: Dickson Arizmendi MD;  Location: [facility] MAIN OR;  Service: Robotics - DaVinci;  Laterality: N/A;   ,   Family History   Problem Relation Age of Onset    Hypertension Mother     Diabetes Father     Lung cancer Father     Cancer Sister     Kidney cancer Maternal Grandmother     Lymphoma Maternal Grandfather    ,   Social History     Tobacco Use    Smoking status: Former     Current packs/day: 0.00     Average packs/day: 1 pack/day for 4.0 years (4.0 ttl pk-yrs)     Types: Cigarettes     Start date: 1977     Quit date: 1981     Years since quittin.9    Smokeless tobacco: Never   Vaping Use    Vaping status: Never Used   Substance Use Topics    Alcohol use: Not Currently    Drug use: Not Currently     Comment: I dont remember   ,   Medications Prior to Admission   Medication Sig Dispense Refill Last Dose/Taking    Chlorhexidine Gluconate 4 % solution Apply 1 Application topically to the appropriate area as directed 2 (Two) Times a Day. Shower With Hibiclens Solution Twice The Day Before Surgery 236 mL 0 10/22/2024 Morning    [] metroNIDAZOLE (Flagyl) 500 MG tablet Take 1 tablet by mouth Every 1 (One) Hour for 3 doses. 3 tablet 0 10/21/2024    ondansetron (Zofran) 4 MG tablet Take 1 tablet by mouth Daily As Needed for Nausea or Vomiting for up to 60 days. 30 tablet 1 10/22/2024 Morning    sertraline (ZOLOFT) 50 MG tablet Take 1 tablet by mouth once daily 90 tablet 0 10/22/2024 Morning    sodium-potassium-magnesium sulfates (SUPREP) 17.5-3.13-1.6 GM/177ML solution oral solution Step 1: 5 PM the day before your scheduled colonoscopy - Take your 1st dose of bowel prep Step 2: 5 AM - Take your 2nd dose of bowel prep FIRST DOSE: 5 PM the day before your scheduled colonoscopy.  "Pour one 6-ounce bottle of SUPREP liquid into the mixing container. Add cool drinking water to the 16-ounce line on the container and mix; this will dilute the concentrated solution. Drink all the liquid in the container at one time - using a straw will help. Drink two more 16-ounce glasses of water over the next hour. Watery bowel movements should begin in approximately one hour. SECOND DOSE: 5 AM On the day of your colonoscopy. Repeat steps 1-4. You may still have watery bowel movements after you finish drinking the solution. 177 mL 0 10/22/2024 Morning   , Scheduled Meds:  acetaminophen, 1,000 mg, Oral, Q6H  heparin (porcine), 5,000 Units, Subcutaneous, Q8H  methocarbamol, 750 mg, Oral, 4x Daily  pregabalin, 25 mg, Oral, Q8H  sertraline, 50 mg, Oral, Daily    , Continuous Infusions:   , PRN Meds:    HYDROmorphone **AND** naloxone    ketorolac    ondansetron    oxyCODONE    oxyCODONE   Allergies:  Penicillins    Subjective     ROS:  Review of Systems     Objective   Vital Signs:   /69 (BP Location: Right arm, Patient Position: Lying)   Pulse 72   Temp 98 °F (36.7 °C) (Oral)   Resp 20   Ht 180.3 cm (71\")   Wt 93.9 kg (207 lb)   SpO2 93%   BMI 28.87 kg/m²     Physical Exam: (performed by MD)  Physical Exam    Results Review:  Lab Results (last 48 hours)      Final Diagnosis   Specimen #1 (Omentum, omentectomy):    Benign adipose tissue with no significant histopathology    No evidence of malignancy     Specimen #2 (Colon, total colectomy):    Cecum with invasive moderately differentiated adenocarcinoma (mpT3, pN0, see synoptic report)    Distal colon with intramucosal moderately differential adenocarcinoma (mpTis, pN0, see synoptic report)    All surgical margins negative for adenocarcinoma    Multiple tubular adenomas, one with high-grade dysplasia, negative for carcinoma    Seventeen lymph nodes negative for metastatic carcinoma (0/17)               Pending Results:     Imaging Reviewed:   No " radiology results for the last 7 days         Assessment & Plan   ASSESSMENT  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  CEA 3.63.  Patient will need genetic testing as an outpatient due to recurrent colon cancers.  Will follow-up in the office.  History of colon cancer in the 1980s status post resection and chemotherapy and radiation.  Normocytic anemia: Mild.  Will monitor.  5 mm nodule in the left upper lung: Continue surveillance    PLAN  As above    Electronically signed by RASTA Meyer, 10/25/24, 8:16 AM EDT.    10/25/2024: I was asked to see Ms. Vann who is now recovering from a total colectomy for two separate colonic adenocarcinomas, one cecal and the other distal. One a pT3N0 and the other pTisN0. She also carried a history of early stage colon carcinoma which was diagnosed in her 30's. She had undergone a colonoscopy that reported both lesions. She had not lost weight. She ad been eating well and had not experienced any fevers. She denied chest pain or abdominal pain, as well. On exam she is conversant and oriented. No distress and no jaundice. No oral lesions and respirations not labored. Lungs clear and heart regular. Discussed with her her family history. No relatives with a history of colon cancer. Her father, who was a smoker,  of lung cancer. A sister of hers had also a malignancy but the source had never been identified. She has 4 children and grand children. Discussed at length with her. Her history is concerning for the possibility of an inherited predisposition to colonic malignancy. She is to see me as outpatient. She will have genetic counseling.   I reviewed the records with Ms. Basil MAGDALENO and concur with her note. I formulated the analysis and the plans.     Donte Jameson MD performed the physical exam on 10/25/2024 at 17:48

## 2024-10-25 NOTE — OP NOTE
CRS Operative Note   Name: Mali Vann  : 1960    Date of Surgery: 10/22/2024  Pre-op Diagnosis: Malignant neoplasm of ascending colon [C18.2]  History of colon cancer [Z85.038]  Colon cancer [C18.9]   Post-op Diagnosis: same  Procedure:   Robotic assisted laparoscopic total abdominal colectomy  Cystoscopy with ICG injection    Surgeon: Dickson Arizmendi M.D.  Assistants: Esther Mccarty, RN CSA  was responsible for performing the following activities: Retraction, Suction, Irrigation, Suturing, Closing, and Placing Dressing and their skilled assistance was necessary for the success of this case.  Anesthesia: General  IV Fluids: refer to anesthesia record  Estimated Blood Loss: 500 CC  Drains: 19Fr X 2   Implants: none  Specimen: 1. 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 The patient was place in the lithotomy position.   All georgia prominences were padded, antibiotics were given, and scds were placed.     I performed a rigid cystoscopy.  Right and left ureteral orifices were identified.  A Pattison catheter was used to cannulate both ureters.  5 cc ICG was injected in both ureters.  The rigid cystoscopy and the pollock catheters were removed.  A Ashby catheter was inserted sterilely.      The patient was prepped and drapped in the usual sterile fashion.    A 4 cm Pfannenstiel incision was made.  A soft tissue was dissected down.  The anterior fascia was excised sharply.  A flap was created both inferiorly and superiorly.  The rectus muscle was split.  The  abdomen was entered safely.  Dane wound protector was placed with a green.  The abdomen was then insufflated to 15 mmHg.  The abdomen was then explored.  There was no abdominal wall lesions or liver lesion.    A 12 mm trocar was placed in the right lower quadrant.  Additional three 8 mm trocar were placed in diagonal line from the 12 mm trocar towards the left midclavicular line.  Additional 8 mm robotic trocar was placed just to the left and inferior to the umbilicus.  An air seal trocar was placed in the right flank.  The patient was then placed in Trendelenburg with right side up.  The small bowel were swept away from the right colon.  We noted significant adhesions between the terminal ileum and the pelvic sidewall and the pelvis.  The right colon was wrapped around with omental adhesions.    The robot was docked.  I started peeling the omentum off of the right colon.  The cecum and the terminal ileum was noted to be adherent to the right lower quadrant.  The ileocolic pedicle was identified.  The peritoneum overlying the pedicle was excised sharply.  The duodenum was identified.  High ligation of the ileocolic vessel was carried using a vessel sealer.  The retroperitoneum was dissected off the right colon mesentery with blunt dissection.  This dissection was continued towards the right abdominal wall.  The wall of Toldt was identified.  Dissection was also carried towards the hepatic flexure.  Within the lateral to medial dissection by excising the white line of Toldt the dissection planes were met.  We then continued our dissection towards the transverse colon.  The omentum and the transverse colon was noted to be significantly adhered to the hepatic fossa secondary to her gallbladder surgery.  Again this was really challenging to identify and dissect the transverse colon due to severe adhesions.  This started towards the mid to distal transverse colon.  The omentum was excised of the transverse colon.  The  lesser sac was entered.  We used this plane to continue to dissect towards the hepatic flexure.  After blunt and sharp dissection I was able to take down the hepatic flexure completely.  We then started taking the mesentery of the colon.    The duodenum and critical structures with identified clearly.  We then turned our attention towards the pelvis.  Again, the terminal ileum was fibrosed and scarred to the sacral promontory in the pelvis and the sigmoid.  We undocked the robot and switched our dissection towards the pelvis.  Tattoo was noted at the distal sigmoid colon.  With sharp and blunt dissection the terminal ileum was dissected off the sigmoid colon.  There was significant pelvic fibrosis noted.  This is likely from her previous radiation.  Due to difficulty in my dissection from medial to lateral I decided to take lateral to medial approach.  Started my dissection in the mid descending colon.  There was no white line of Toldt at this region secondary to her previous surgery.  Was able to identify the mesentery of the colon and dissected off from the retroperitoneum.  Continued this plane down to the pelvis.  I was able to identify the ureter which was very closely adherent to the mesentery and also the sidewall of the colon.  I took my time to dissect the ureter off the colon.  I then identified my left colic branch and ligated at its origin.  We then continued our dissection towards the pelvis.  The mesentery of the sigmoid colon was significantly fibrosed.    The sacral promontory was identified the sigmoid colon and the mesentery was dissected off.  We were able to dissect below the tattooed area.  The mesentery of the rectum was ligated and stapled across with few centimeter margins.  We then continued our dissection proximally.  The mesentery of the descending colon was ligated using vessel sealer.  We continued our dissection bluntly to dissect the descending colon mesentery of the retroperitoneum.   The lateral attachments were excised sharply.  We continued this dissection towards the splenic flexure.  The splenic flexure was significantly difficult due to severe adhesion to the spleen.  With meticulous sharp and blunt dissection the splenic flexure was taken down.    We then turned back towards the pelvis.  The small bowel was dissected off the sacral promontory and the pelvic sidewall.  This was very fibrosed and adhered with significant radiation change.    We then undocked the robot and completed the rest of the dissection using hand-assisted approach.  A vertical midline incision was created.  A GelPort was placed.  In the last portion of the transverse colon the transverse mesentery was ligated using a vessel sealer.  The specimen was then exteriorized through the midline incision.  The terminal ileum was noted to have significant radiation change.  It was fibrosed and hard.  With 15 cm margins the mesentery of the terminal ileum was dissected and ligated.  The terminal ileum was stapled across using a blue load DENI linear stapler.  The specimen was then passed for pathology.    The terminal ileum has significant radiation change.  The radiation change extended to over 30 cm.  The staple line of the terminal ileum was excised.  A 25 EEA anvil was attempted to pass through however due to the fibrosis of the terminal ileum and noncompliance this was not able to accommodate the anvil.  The serosa of the bowel was easily fractured which confirms the severe radiation change.  The mucosa of the terminal ileum was also noted was severe radiation changes.  Due to this I decided that doing anastomosis will not be feasible with really poor outcome.  I discussed my findings and my decision with family before proceeding.    The abdomen was suctioned and irrigated.  2 drains were placed using the trocars.  One was placed in the left upper quadrant towards the pelvis.  The air seal port was used to place another 19  Georgian drain on the right side towards the pelvis.  The 12 mm right lower quadrant port was used for ileostomy site.  All the trocars were removed.  The circular incision was made over the 12 mm trocar site.  The soft tissue and the fascia were excised.  The terminal ileum was passed through the incisions and exteriorized.    The abdomen was suctioned and irrigated multiple times with Irrisept and warm saline.  The fascia at the midline was closed with a running strata fix.  The fascial incision fascia was also closed with #1 strata fix.  All skin were closed using skin stapler after washing the wound.  Dressing was applied.      The ileostomy was created by excising the staple line and affixing it in all four quadrants to the dermis with 3-0 vicryl interrupted Corina sutures. Intervening interrupted 3-0 vicryl stitches were placed radially to secure the ileostomy to the dermis.   An ostomy bag was then placed on top and dressings applied and the case concluded.    Counts: Instrument, sponge, and needle counts were reported to be correct prior to closure and at the conclusion of the case.  Disposition  The patient was taken to Recovery Room in good condition.

## 2024-10-25 NOTE — NURSING NOTE
WOCN note:    64 yr old female admitted 10/22/24 for a laparoscopic total colectomy and creation of ileostomy for colon cancer. [facility] follow up for ongoing ostomy education.     Patient awake and alert with spouse at the bedside. Spouse reported possible discharge today or tomorrow. Patient agreeable for another pouch change today for teaching purposes. The flat one piece remains intact but slight leakage noted at 9:00 upon removal. The devi-stomal skin is intact. The stoma is dark red and slightly budded with the os emptying at 6:00. The effluent is mostly dark green and liquid.   The skin was cleansed with water and dried well. Skin prep was applied to the devi-stomal skin. The mucocutaneous junction is intact. An [employer] slim barrier ring was placed around the stoma and a 2 piece convex system was applied.     Patient and spouse observed the procedure although the patient dozed occasionally. They were instructed on all of the supplies including use of the ostomy belt, stomahesive paste and use of the stoma powder and skin prep to treat  devi-stomal dermatitis issues. Spouse was instructed on written and video teaching guides and ostomy resources. [employer] sample request also sent to [facility]. All questions were answered and supplies and information folder were left at the bedside.

## 2024-10-25 NOTE — CASE MANAGEMENT/SOCIAL WORK
Continued Stay Note  [facility]     Patient Name: Mali Vann  MRN: [id number]  Today's Date: 10/25/2024    Admit Date: 10/22/2024    Plan: DC PLAN: Routine home. Referral to [facility]. Wound Care following, New Ostomy       Discharge Plan       Row Name 10/25/24 1153       Plan    Plan DC PLAN: Routine home. Referral to [facility]. Wound Care following, New Ostomy      Patient/Family in Agreement with Plan yes    Plan Comments DCP report sent to [facility], message sent to liasion awaiting response. Wound care following for new Ostomy, notified due to insurace,may have an issue getting home health. Verbalized understanding. States  was going to call [facility] to inquire about HHC and Supplies. NAHOMI drains in place. Anticipate Discharge later today or tomorrow.                      Expected Discharge Date and Time       Expected Discharge Date Expected Discharge Time    Oct 26, 2024             Carly Moreno RN   Case Management  [phone]

## 2024-10-25 NOTE — DISCHARGE PLACEMENT REQUEST
"Anju Cifuentes (64 y.o. Female)       Date of Birth   1960    Social Security Number       Address   [address] [facility] [place] [facility]    Home Phone   [phone]    MRN   [id number]       Orthodoxy   Hinduism    Marital Status                               Admission Date   10/22/24    Admission Type   Elective    Admitting Provider   Dickson Arizmendi MD    Attending Provider   Dickson Arizmendi MD    Department, Room/Bed   [facility] SURGICAL INPATIENT, 4126/1       Discharge Date       Discharge Disposition       Discharge Destination                                 Attending Provider: Dickson Arizmendi MD    Allergies: Penicillins    Isolation: None   Infection: None   Code Status: CPR    Ht: 180.3 cm (71\")   Wt: 93.9 kg (207 lb)    Admission Cmt: None   Principal Problem: Colon cancer [C18.9]                   Active Insurance as of 10/22/2024       Primary Coverage       Payor Plan Insurance Group Employer/Plan Group    MISC SHARING PLAN MISC SHARING PLAN [id number]       Coverage Address Coverage Phone Number Coverage Fax Number Effective Dates    [address] [phone]  12/1/2020 - None Entered    [place] [address]         Subscriber Name Subscriber Birth Date Member ID       ANJU CIFUENTES 1960 [id number]                     Emergency Contacts        (Rel.) Home Phone Work Phone Mobile Phone    ERICKSON CIFUENTES (Spouse) [phone] -- [phone]                "

## 2024-10-25 NOTE — PLAN OF CARE
Assessment: Mali Vann presents with functional mobility impairments which indicate the need for skilled intervention. Tolerating session today without incident. Will continue to follow and progress as tolerated.     Plan/Recommendations:   If medically appropriate, No ongoing therapy recommended post-acute care. No therapy needs. Pt requires no DME at discharge.     Pt desires Home with family assist at discharge. Pt cooperative; agreeable to therapeutic recommendations and plan of care.

## 2024-10-26 ENCOUNTER — TELEPHONE (OUTPATIENT)
Dept: FAMILY MEDICINE CLINIC | Facility: CLINIC | Age: 64
End: 2024-10-26
Payer: COMMERCIAL

## 2024-10-26 ENCOUNTER — READMISSION MANAGEMENT (OUTPATIENT)
Dept: CALL CENTER | Facility: HOSPITAL | Age: 64
End: 2024-10-26
Payer: COMMERCIAL

## 2024-10-26 VITALS
WEIGHT: 207 LBS | SYSTOLIC BLOOD PRESSURE: 104 MMHG | HEIGHT: 71 IN | RESPIRATION RATE: 12 BRPM | HEART RATE: 83 BPM | OXYGEN SATURATION: 96 % | TEMPERATURE: 97.8 F | DIASTOLIC BLOOD PRESSURE: 70 MMHG | BODY MASS INDEX: 28.98 KG/M2

## 2024-10-26 LAB
ANION GAP SERPL CALCULATED.3IONS-SCNC: 11.1 MMOL/L (ref 5–15)
BASOPHILS # BLD AUTO: 0.06 10*3/MM3 (ref 0–0.2)
BASOPHILS NFR BLD AUTO: 0.5 % (ref 0–1.5)
BUN SERPL-MCNC: 10 MG/DL (ref 8–23)
BUN/CREAT SERPL: 16.4 (ref 7–25)
CALCIUM SPEC-SCNC: 8.8 MG/DL (ref 8.6–10.5)
CHLORIDE SERPL-SCNC: 97 MMOL/L (ref 98–107)
CO2 SERPL-SCNC: 22.9 MMOL/L (ref 22–29)
CREAT SERPL-MCNC: 0.61 MG/DL (ref 0.57–1)
DEPRECATED RDW RBC AUTO: 48.1 FL (ref 37–54)
EGFRCR SERPLBLD CKD-EPI 2021: 100 ML/MIN/1.73
EOSINOPHIL # BLD AUTO: 0.24 10*3/MM3 (ref 0–0.4)
EOSINOPHIL NFR BLD AUTO: 1.9 % (ref 0.3–6.2)
ERYTHROCYTE [DISTWIDTH] IN BLOOD BY AUTOMATED COUNT: 14.1 % (ref 12.3–15.4)
GLUCOSE SERPL-MCNC: 108 MG/DL (ref 65–99)
HCT VFR BLD AUTO: 33.5 % (ref 34–46.6)
HGB BLD-MCNC: 10.7 G/DL (ref 12–15.9)
IMM GRANULOCYTES # BLD AUTO: 0.49 10*3/MM3 (ref 0–0.05)
IMM GRANULOCYTES NFR BLD AUTO: 3.9 % (ref 0–0.5)
LYMPHOCYTES # BLD AUTO: 1.2 10*3/MM3 (ref 0.7–3.1)
LYMPHOCYTES NFR BLD AUTO: 9.5 % (ref 19.6–45.3)
MAGNESIUM SERPL-MCNC: 2.2 MG/DL (ref 1.6–2.4)
MCH RBC QN AUTO: 29.6 PG (ref 26.6–33)
MCHC RBC AUTO-ENTMCNC: 31.9 G/DL (ref 31.5–35.7)
MCV RBC AUTO: 92.5 FL (ref 79–97)
MONOCYTES # BLD AUTO: 0.9 10*3/MM3 (ref 0.1–0.9)
MONOCYTES NFR BLD AUTO: 7.1 % (ref 5–12)
NEUTROPHILS NFR BLD AUTO: 77.1 % (ref 42.7–76)
NEUTROPHILS NFR BLD AUTO: 9.79 10*3/MM3 (ref 1.7–7)
NRBC BLD AUTO-RTO: 0 /100 WBC (ref 0–0.2)
PHOSPHATE SERPL-MCNC: 3.3 MG/DL (ref 2.5–4.5)
PLATELET # BLD AUTO: 270 10*3/MM3 (ref 140–450)
PMV BLD AUTO: 9.6 FL (ref 6–12)
POTASSIUM SERPL-SCNC: 4.2 MMOL/L (ref 3.5–5.2)
RBC # BLD AUTO: 3.62 10*6/MM3 (ref 3.77–5.28)
SODIUM SERPL-SCNC: 131 MMOL/L (ref 136–145)
WBC NRBC COR # BLD AUTO: 12.68 10*3/MM3 (ref 3.4–10.8)

## 2024-10-26 PROCEDURE — 84100 ASSAY OF PHOSPHORUS: CPT | Performed by: STUDENT IN AN ORGANIZED HEALTH CARE EDUCATION/TRAINING PROGRAM

## 2024-10-26 PROCEDURE — 85025 COMPLETE CBC W/AUTO DIFF WBC: CPT | Performed by: STUDENT IN AN ORGANIZED HEALTH CARE EDUCATION/TRAINING PROGRAM

## 2024-10-26 PROCEDURE — 99024 POSTOP FOLLOW-UP VISIT: CPT | Performed by: STUDENT IN AN ORGANIZED HEALTH CARE EDUCATION/TRAINING PROGRAM

## 2024-10-26 PROCEDURE — 97116 GAIT TRAINING THERAPY: CPT

## 2024-10-26 PROCEDURE — 83735 ASSAY OF MAGNESIUM: CPT | Performed by: STUDENT IN AN ORGANIZED HEALTH CARE EDUCATION/TRAINING PROGRAM

## 2024-10-26 PROCEDURE — 97530 THERAPEUTIC ACTIVITIES: CPT

## 2024-10-26 PROCEDURE — 80048 BASIC METABOLIC PNL TOTAL CA: CPT | Performed by: STUDENT IN AN ORGANIZED HEALTH CARE EDUCATION/TRAINING PROGRAM

## 2024-10-26 PROCEDURE — 25010000002 HEPARIN (PORCINE) PER 1000 UNITS: Performed by: STUDENT IN AN ORGANIZED HEALTH CARE EDUCATION/TRAINING PROGRAM

## 2024-10-26 RX ORDER — PANTOPRAZOLE SODIUM 40 MG/1
40 TABLET, DELAYED RELEASE ORAL DAILY
Qty: 40 TABLET | Refills: 0 | Status: SHIPPED | OUTPATIENT
Start: 2024-10-26 | End: 2024-12-05

## 2024-10-26 RX ORDER — OXYCODONE HYDROCHLORIDE 5 MG/1
5 TABLET ORAL EVERY 8 HOURS PRN
Qty: 40 TABLET | Refills: 0 | Status: SHIPPED | OUTPATIENT
Start: 2024-10-26 | End: 2024-12-05

## 2024-10-26 RX ORDER — LOPERAMIDE HYDROCHLORIDE 2 MG/1
2 TABLET ORAL 4 TIMES DAILY PRN
Qty: 80 TABLET | Refills: 1 | Status: SHIPPED | OUTPATIENT
Start: 2024-10-26 | End: 2025-04-04

## 2024-10-26 RX ADMIN — SERTRALINE 50 MG: 50 TABLET, FILM COATED ORAL at 08:37

## 2024-10-26 RX ADMIN — METHOCARBAMOL 750 MG: 750 TABLET ORAL at 11:39

## 2024-10-26 RX ADMIN — HEPARIN SODIUM 5000 UNITS: 5000 INJECTION INTRAVENOUS; SUBCUTANEOUS at 05:31

## 2024-10-26 RX ADMIN — PREGABALIN 25 MG: 25 CAPSULE ORAL at 13:32

## 2024-10-26 RX ADMIN — AVOBENZONE, HOMOSALATE, OCTISALATE, OCTOCRYLENE, AND OXYBENZONE 1 PACKET: 29.4; 147; 49; 25.4; 58.8 LOTION TOPICAL at 08:37

## 2024-10-26 RX ADMIN — METHOCARBAMOL 750 MG: 750 TABLET ORAL at 08:37

## 2024-10-26 RX ADMIN — FAMOTIDINE 20 MG: 10 INJECTION INTRAVENOUS at 05:31

## 2024-10-26 RX ADMIN — LOPERAMIDE HYDROCHLORIDE 2 MG: 2 CAPSULE ORAL at 08:37

## 2024-10-26 RX ADMIN — HEPARIN SODIUM 5000 UNITS: 5000 INJECTION INTRAVENOUS; SUBCUTANEOUS at 13:53

## 2024-10-26 RX ADMIN — OXYCODONE 5 MG: 5 TABLET ORAL at 13:31

## 2024-10-26 NOTE — DISCHARGE INSTRUCTIONS
Post-Operative Discharge Instructions: Colon resection  [facility]      Your successful surgery marks an important step toward your recovery. To ensure a smooth and griffin recovery process, please follow these post-operative discharge instructions:    1. Wound Care:    -Keep the incision areas clean and dry.  -Report any signs of infection such as increased redness, swelling, or discharge.  - Its ok to take a shower.     2. Pain Management:    -Take prescribed pain medications as directed.  - Take tylenol and motrin alternatively every three hours. You can take prescribed narcotics only if you have breakthrough pain.   - Do not drive if you have taken narcotic pain meds.       3. Activity:    - Begin with light activities like short walks around the house.  - Gradually increase activity levels as tolerated, but avoid strenuous activities for the first few weeks.  - Avoid heavy lifting (more than 10 pounds) until cleared by your surgeon.    4. Diet:    - Continue with regular diet. If you start feeling bloated or nauseas, back down to clear liquid diet and progress to a full liquid diet as tolerated.Gradually reintroduce solid foods.  - Stay well-hydrated and avoid carbonated beverages initially.    5. Medications:    -Resume regular medications as instructed by your healthcare provider.    6. Follow-up Appointments:    - Schedule and attend all follow-up appointments with your surgeon and healthcare team.  - Discuss any concerns or questions you may have about your recovery.    7. Signs of Complications:    Seek immediate medical attention if you experience:  -Persistent fever  -Increased abdominal pain  -Worsening redness, swelling, or discharge at the incision site  -Persistent nausea or vomiting  -Difficulty breathing or chest pain    8. Rest and Self-Care:    -Allow yourself time to rest and recover.  -Prioritize self-care and listen to your body.  -Reach out to friends, family, or support groups for  assistance and emotional support.    Remember, these instructions are general guidelines, and your surgeon may provide specific recommendations tailored to your case. If you have any questions or concerns during your recovery, don't hesitate to contact your healthcare provider.    Wishing you a smooth and speedy recovery!  Dickson Arizmendi MD   Colon and Rectal Surgery   [facility] [place], [address]  T: [phone]

## 2024-10-26 NOTE — THERAPY TREATMENT NOTE
Subjective: Pt agreeable to therapeutic plan of care.    Objective:     Precautions - falls, ostomy, contact, 2 lisa    Bed mobility - SBA and vc's to logroll  Transfers - SBA and with rolling walker  Ambulation - 80 feet SBA and with rolling walker, up/down 4 stairs step to gait with HR        Vitals: WNL    Pain:  minimal pain in abdomen  Intervention for pain: Repositioned, Increased Activity, and Therapeutic Presence    Education: Provided education on the importance of mobility in the acute care setting, Verbal/Tactile Cues, Transfer Training, and Gait Training    Assessment: Mali Vann presents with functional mobility impairments which indicate the need for skilled intervention. Tolerating session today without incident. Patient doing very well, still a little slowed gabrielle and guarded. Wanted a rwx to help with mobility at PA.Will continue to follow and progress as tolerated.     Plan/Recommendations:   If medically appropriate, No ongoing therapy recommended post-acute care. No therapy needs. Pt requires rolling walker at discharge.     Pt desires Home with family assist at discharge. Pt cooperative; agreeable to therapeutic recommendations and plan of care.         Basic Mobility 6-click:  Rollin = Total, A lot = 2, A little = 3; 4 = None  Supine>Sit:   1 = Total, A lot = 2, A little = 3; 4 = None   Sit>Stand with arms:  1 = Total, A lot = 2, A little = 3; 4 = None  Bed>Chair:   1 = Total, A lot = 2, A little = 3; 4 = None  Ambulate in room:  1 = Total, A lot = 2, A little = 3; 4 = None  3-5 Steps with railin = Total, A lot = 2, A little = 3; 4 = None  Score: 24      Post-Tx Position: Supine with HOB Elevated and Call light and personal items within reach  PPE: gloves    Therapy Charges for Today       Code Description Service Date Service Provider Modifiers Qty    82433268399 HC GAIT TRAINING EA 15 MIN 10/26/2024 Aylin Khan, [facility] GP 1    24951046294 HC PT THERAPEUTIC ACT EA 15 MIN  10/26/2024 Aylin Khan, PTA GP 1

## 2024-10-26 NOTE — PLAN OF CARE
ssessment: Mali Vann presents with functional mobility impairments which indicate the need for skilled intervention. Tolerating session today without incident. Patient doing very well, still a little slowed gabrielle and guarded. Wanted a rwx to help with mobility at OR.Will continue to follow and progress as tolerated.

## 2024-10-26 NOTE — TELEPHONE ENCOUNTER
She was discharged from the hospital after surgery for colon cancer.    Please make her a follow up appointment. She had some abnormal labs that need to be followed up on.

## 2024-10-26 NOTE — DISCHARGE SUMMARY
Date of Admission: 10/22/2024  Date of Discharge:  10/26/2024  Primary Care Physician: Hui Noe APRN     Discharge Diagnosis:  Active Hospital Problems    Diagnosis  POA    **Colon cancer [C18.9]  Unknown    History of colon cancer [Z85.038]  Unknown      Resolved Hospital Problems   No resolved problems to display.       Presenting Problem/History of Present Illness:  Malignant neoplasm of ascending colon [C18.2]  History of colon cancer [Z85.038]  Colon cancer [C18.9]     Hospital Course:  The patient is a 64 y.o. female who presented with  colon cancer     Exam Today: soft, non distended, non tender , ostomy functioning     Procedures Performed:  Procedure(s):  LAPAROSCOPIC TOTAL COLECTOMY WITH DAVINCI ROBOT AND ILEOSTOMY  CYSTOSCOPY       Consults:   Consults       No orders found from 9/23/2024 to 10/23/2024.             Discharge Disposition:  Home or Self Care    Discharge Medications:     Discharge Medications        New Medications        Instructions Start Date   loperamide 2 MG tablet  Commonly known as: Imodium A-D   2 mg, Oral, 4 Times Daily PRN      oxyCODONE 5 MG immediate release tablet  Commonly known as: Roxicodone   5 mg, Oral, Every 8 Hours PRN      pantoprazole 40 MG EC tablet  Commonly known as: Protonix   40 mg, Oral, Daily             Continue These Medications        Instructions Start Date   Chlorhexidine Gluconate 4 % solution   1 Application, Topical, 2 Times Daily, Shower With Hibiclens Solution Twice The Day Before Surgery      ondansetron 4 MG tablet  Commonly known as: Zofran   4 mg, Oral, Daily PRN      sertraline 50 MG tablet  Commonly known as: ZOLOFT   50 mg, Oral, Daily      sodium-potassium-magnesium sulfates 17.5-3.13-1.6 GM/177ML solution oral solution  Commonly known as: SUPREP   Step 1: 5 PM the day before your scheduled colonoscopy - Take your 1st dose of bowel prep Step 2: 5 AM - Take your 2nd dose of bowel prep FIRST DOSE: 5 PM the day before your scheduled  colonoscopy. Pour one 6-ounce bottle of SUPREP liquid into the mixing container. Add cool drinking water to the 16-ounce line on the container and mix; this will dilute the concentrated solution. Drink all the liquid in the container at one time - using a straw will help. Drink two more 16-ounce glasses of water over the next hour. Watery bowel movements should begin in approximately one hour. SECOND DOSE: 5 AM On the day of your colonoscopy. Repeat steps 1-4. You may still have watery bowel movements after you finish drinking the solution.             Stop These Medications      metroNIDAZOLE 500 MG tablet  Commonly known as: Flagyl              Discharge Diet: regular     Activity at Discharge: as tolerated, avoid lifting over ten lbs     Follow-up Appointments:  No future appointments.      Test Results Pending at Discharge:none        Dickson Arizmendi MD  10/26/24  12:00 EDT    Time Spent on Discharge Activities: 10 min

## 2024-10-26 NOTE — PLAN OF CARE
Goal Outcome Evaluation:   Patient alert oriented able to make needs known.Pain controlled with po pain medication. Family at bedside. Ambulates to bathroom with standby assist and walker. Dressing on drains changes. Ileostomy appliance changed. Bed in low position, call light in reach, bed alarm set, room near nursing station.

## 2024-10-26 NOTE — DISCHARGE PLACEMENT REQUEST
"Anju Cifuetnes (64 y.o. Female)       Date of Birth   1960    Social Security Number       Address   [address] [place] [address]    Home Phone   [phone]    MRN   [id number]       Restoration   Denominational    Marital Status                               Admission Date   10/22/24    Admission Type   Elective    Admitting Provider   Dickson Arizmendi MD    Attending Provider   Dickson Arizmendi MD    Department, Room/Bed   [facility] SURGICAL INPATIENT, 6/       Discharge Date       Discharge Disposition   Home or Self Care    Discharge Destination                                 Attending Provider: Dickson Arizmendi MD    Allergies: Penicillins    Isolation: None   Infection: None   Code Status: CPR    Ht: 180.3 cm (71\")   Wt: 93.9 kg (207 lb)    Admission Cmt: None   Principal Problem: Colon cancer [C18.9]                   Active Insurance as of 10/22/2024       Primary Coverage       Payor Plan Insurance Group Employer/Plan Group    MISC SHARING PLAN MISC SHARING PLAN [id number]       Coverage Address Coverage Phone Number Coverage Fax Number Effective Dates    [address] [phone]  2020 - None Entered    [facility] [id number]         Subscriber Name Subscriber Birth Date Member ID       ANJU CIFUENTES 1960 [id number]                     Emergency Contacts        (Rel.) Home Phone Work Phone Mobile Phone    ERICKSON CIFUENTES (Spouse) [phone] -- [phone]               [facility] SURGICAL INPATIENT  [address] [facility] [place] [address]  Dept. Phone:  [phone]  Dept. Fax:  [phone] Date Ordered: Oct 26, 2024         Patient:  Anju Cifuentes MRN:  [id number]   [address]  [place] [address] :  1960  SSN:    Phone: [phone] Sex:  F     Weight: 93.9 kg (207 lb)         Ht Readings from Last 1 Encounters:   10/10/24 180.3 cm (71\")         Walker  Walker Folding with Wheels              (Order ID: [id number])  "   Diagnosis:  Malignant neoplasm of ascending colon (C18.2 [ICD-10-CM] 153.6 [ICD-9-CM])   Quantity:  1     Equipment:  Walker Folding with Wheels  Length of Need: 3 Months        Authorizing Provider's Phone: [phone]  Authorizing Provider:Dickson Arizmendi MD  Authorizing Provider's NPI: [id number]  Order Entered By: Dickson Arizmendi MD 10/26/2024 12:07 PM     Electronically signed by: Dickson Arizmendi MD 10/26/2024 12:07 PM

## 2024-10-26 NOTE — OUTREACH NOTE
Prep Survey      Flowsheet Row Responses   [facility]   Is LACE score < 7 ? No   Eligibility [facility]   Date of Admission 10/22/24   Date of Discharge 10/26/24   Discharge Disposition Home or Self Care   Discharge diagnosis COLON RESECTION   Does the patient have one of the following disease processes/diagnoses(primary or secondary)? General Surgery   Does the patient have Home health ordered? Yes   What is the Home health agency?  [facility]. Wound Care following, New Ostomy   Is there a DME ordered? Yes   [facility]   Prep survey completed? Yes            DAVID WEBSTER - Registered Nurse

## 2024-10-26 NOTE — PLAN OF CARE
Goal Outcome Evaluation:         Pt is able to make needs known. Pain is managed with medication regimen. Pt ambulated to restroom with stand by assist. Education is complete, call light is in reach, and no other needs at this time. Continue to monitor.

## 2024-10-27 NOTE — SIGNIFICANT NOTE
10/26/24 1300   Plan   Plan Comments CM notified by nursing that patient needed a rolling walker at discharge. Orders placed by attending MD. CM delivered rolling walker to bedside. [facility] referral pending. CM reached out to liaaddison Quezada who confirmed that patient was declined on 10/25 due to insurance/payor. CM reached out to [facility] liaison Antoinette regarding if patient is still considered for . Liaison was notified that patient has [facility] and, per the , the patient receives all of the bills for services and sends them to [facility], who then pays for the bills. Liaison was given phone number [phone] for [facility]. [facility] is out of network, per liaison.     Continued Stay Note   [facility]     Patient Name: Mali Vann  MRN: [id number]  Today's Date: 10/27/2024    Admit Date: 10/22/2024        Expected Discharge Date and Time       Expected Discharge Date Expected Discharge Time    Oct 26, 2024             Lisa Lopez RN    Office: [phone]  Fax: [phone]

## 2024-10-28 ENCOUNTER — TELEPHONE (OUTPATIENT)
Age: 64
End: 2024-10-28
Payer: COMMERCIAL

## 2024-10-28 ENCOUNTER — TRANSITIONAL CARE MANAGEMENT TELEPHONE ENCOUNTER (OUTPATIENT)
Dept: CALL CENTER | Facility: HOSPITAL | Age: 64
End: 2024-10-28
Payer: COMMERCIAL

## 2024-10-28 NOTE — CASE MANAGEMENT/SOCIAL WORK
Case Management Discharge Note      Final Note: Routine home         Selected Continued Care - Discharged on 10/26/2024 Admission date: 10/22/2024 - Discharge disposition: Home or Self Care                  Durable Medical Equipment Coordination complete.      Service Provider Services Address Phone Fax Patient Preferred    [employer] [facility] [employer] [address] [address] [phone] --                    Transportation Services  Private: Car    Final Discharge Disposition Code: 01 - home or self-care

## 2024-10-28 NOTE — OUTREACH NOTE
Call Center TCM Note      Flowsheet Row Responses   [facility] patient discharged from? [facility]   Does the patient have one of the following disease processes/diagnoses(primary or secondary)? General Surgery   TCM attempt successful? Yes   Call start time 1518   Call end time 1521   Meds reviewed with patient/caregiver? Yes   Is the patient having any side effects they believe may be caused by any medication additions or changes? No   Does the patient have all medications related to this admission filled (includes all antibiotics, pain medications, etc.) Yes   Is the patient taking all medications as directed (includes completed medication regime)? Yes   Does the patient have an appointment with their PCP within 7-14 days of discharge? Yes   What is the Home health agency?  [facility]. Wound Care following, New Ostomy   Has home health visited the patient within 72 hours of discharge? Yes   What DME was ordered? walker   Has all DME been delivered? Yes   Psychosocial issues? No   Did the patient receive a copy of their discharge instructions? Yes   Nursing interventions Reviewed instructions with patient   What is the patient's perception of their health status since discharge? Improving   Nursing interventions Nurse provided patient education   Is the patient /caregiver able to teach back basic post-op care? Continue use of incentive spirometry at least 1 week post discharge, Take showers only when approved by MD-sponge bathe until then, Do not remove steri-strips, Lifting as instructed by MD in discharge instructions, No tub bath, swimming, or hot tub until instructed by MD, Practice 'cough and deep breath', Drive as instructed by MD in discharge instructions, Keep incision areas clean,dry and protected   Is the patient/caregiver able to teach back signs and symptoms of incisional infection? Increased redness, swelling or pain at the incisonal site, Pus or odor from incision, Fever, Increased drainage  or bleeding, Incisional warmth   Is the patient/caregiver able to teach back steps to recovery at home? Set small, achievable goals for return to baseline health, Practice good oral hygiene, Eat a well-balance diet, Rest and rebuild strength, gradually increase activity, Weigh daily, Make a list of questions for surgeon's appointment   If the patient is a current smoker, are they able to teach back resources for cessation? Not a smoker   Is the patient/caregiver able to teach back the hierarchy of who to call/visit for symptoms/problems? PCP, Specialist, Home health nurse, Urgent Care, ED, 911 Yes   TCM call completed? Yes   Wrap up additional comments D/C DX: Colon resection in setting of cancer - Pt doing ok, concerned about diminished output into colostomy bag but has been in touch with surgeon. Will cut back on Imodium. No other questions. First POST OP is 11/11/2024. TCM CAPPT is 11/06   Call end time 1521            Keren Durant MA    10/28/2024, 15:24 EDT

## 2024-10-28 NOTE — TELEPHONE ENCOUNTER
RN cannot approve Refill Request    RN can NOT refill this medication Protocol failed and NO refill given.      Yenny Lara, [facility] Triage/Med Refill 3/16/2020    Requested Prescriptions   Pending Prescriptions Disp Refills     sertraline (ZOLOFT) 100 MG tablet [Pharmacy Med Name: SERTRALINE 100MG TABLETS] 180 tablet 3     Sig: TAKE 2 TABLETS(200 MG) BY MOUTH DAILY       SS[facility] Refill Protocol  Failed - 3/14/2020 10:33 AM        Failed - PCP or prescribing provider visit in last year     Last office visit with prescriber/PCP: 11/29/2018 Huong Dacosta CNP OR same dept: Visit date not found OR same specialty: 11/29/2018 Huong Dacosta CNP  Last physical: 1/8/2019 Last MTM visit: Visit date not found   Next visit within 3 mo: Visit date not found  Next physical within 3 mo: Visit date not found  Prescriber OR PCP: Huong Dacosta CNP  Last diagnosis associated with med order: 1. Recurrent major depressive disorder, in partial remission (H)  - sertraline (ZOLOFT) 100 MG tablet [Pharmacy Med Name: SERTRALINE 100MG TABLETS]; TAKE 2 TABLETS(200 MG) BY MOUTH DAILY  Dispense: 180 tablet; Refill: 0    If protocol passes may refill for 12 months if within 3 months of last provider visit (or a total of 15 months).                    Unable to contact pt for a p/o call. She is [facility] for nov 1st 830am

## 2024-10-29 ENCOUNTER — DOCUMENTATION (OUTPATIENT)
Age: 64
End: 2024-10-29
Payer: COMMERCIAL

## 2024-10-29 DIAGNOSIS — C18.2 MALIGNANT NEOPLASM OF ASCENDING COLON: Primary | ICD-10-CM

## 2024-10-29 RX ORDER — ONDANSETRON 4 MG/1
4 TABLET, FILM COATED ORAL DAILY PRN
Qty: 30 TABLET | Refills: 1 | Status: SHIPPED | OUTPATIENT
Start: 2024-10-29 | End: 2025-10-29

## 2024-10-29 RX ORDER — METHOCARBAMOL 750 MG/1
750 TABLET, FILM COATED ORAL 3 TIMES DAILY
Qty: 30 TABLET | Refills: 1 | Status: SHIPPED | OUTPATIENT
Start: 2024-10-29 | End: 2024-10-30

## 2024-10-30 ENCOUNTER — APPOINTMENT (OUTPATIENT)
Dept: CT IMAGING | Facility: HOSPITAL | Age: 64
End: 2024-10-30
Payer: COMMERCIAL

## 2024-10-30 ENCOUNTER — HOSPITAL ENCOUNTER (OUTPATIENT)
Facility: HOSPITAL | Age: 64
Setting detail: OBSERVATION
Discharge: HOME | End: 2024-11-03
Attending: EMERGENCY MEDICINE | Admitting: STUDENT IN AN ORGANIZED HEALTH CARE EDUCATION/TRAINING PROGRAM
Payer: COMMERCIAL

## 2024-10-30 ENCOUNTER — READMISSION MANAGEMENT (OUTPATIENT)
Dept: CALL CENTER | Facility: HOSPITAL | Age: 64
End: 2024-10-30
Payer: COMMERCIAL

## 2024-10-30 DIAGNOSIS — K91.89 POSTOPERATIVE ILEUS: Primary | ICD-10-CM

## 2024-10-30 DIAGNOSIS — N17.9 AKI (ACUTE KIDNEY INJURY): ICD-10-CM

## 2024-10-30 DIAGNOSIS — K56.7 POSTOPERATIVE ILEUS: Primary | ICD-10-CM

## 2024-10-30 PROBLEM — Z93.2 ILEOSTOMY IN PLACE: Status: ACTIVE | Noted: 2024-10-30

## 2024-10-30 LAB
ALBUMIN SERPL-MCNC: 3.6 G/DL (ref 3.5–5.2)
ALBUMIN/GLOB SERPL: 0.9 G/DL
ALP SERPL-CCNC: 206 U/L (ref 39–117)
ALT SERPL W P-5'-P-CCNC: 97 U/L (ref 1–33)
ANION GAP SERPL CALCULATED.3IONS-SCNC: 13.6 MMOL/L (ref 5–15)
ANION GAP SERPL CALCULATED.3IONS-SCNC: 16.4 MMOL/L (ref 5–15)
APTT PPP: 25.6 SECONDS (ref 61–76.5)
AST SERPL-CCNC: 54 U/L (ref 1–32)
BASOPHILS # BLD AUTO: 0.03 10*3/MM3 (ref 0–0.2)
BASOPHILS # BLD AUTO: 0.05 10*3/MM3 (ref 0–0.2)
BASOPHILS NFR BLD AUTO: 0.3 % (ref 0–1.5)
BASOPHILS NFR BLD AUTO: 0.3 % (ref 0–1.5)
BILIRUB SERPL-MCNC: 0.8 MG/DL (ref 0–1.2)
BUN SERPL-MCNC: 12 MG/DL (ref 8–23)
BUN SERPL-MCNC: 12 MG/DL (ref 8–23)
BUN/CREAT SERPL: 11.3 (ref 7–25)
BUN/CREAT SERPL: 12 (ref 7–25)
CALCIUM SPEC-SCNC: 8.6 MG/DL (ref 8.6–10.5)
CALCIUM SPEC-SCNC: 9.4 MG/DL (ref 8.6–10.5)
CHLORIDE SERPL-SCNC: 90 MMOL/L (ref 98–107)
CHLORIDE SERPL-SCNC: 94 MMOL/L (ref 98–107)
CO2 SERPL-SCNC: 23.6 MMOL/L (ref 22–29)
CO2 SERPL-SCNC: 25.4 MMOL/L (ref 22–29)
CREAT SERPL-MCNC: 1 MG/DL (ref 0.57–1)
CREAT SERPL-MCNC: 1.06 MG/DL (ref 0.57–1)
DEPRECATED RDW RBC AUTO: 43.4 FL (ref 37–54)
DEPRECATED RDW RBC AUTO: 44.3 FL (ref 37–54)
EGFRCR SERPLBLD CKD-EPI 2021: 58.8 ML/MIN/1.73
EGFRCR SERPLBLD CKD-EPI 2021: 63 ML/MIN/1.73
EOSINOPHIL # BLD AUTO: 0.15 10*3/MM3 (ref 0–0.4)
EOSINOPHIL # BLD AUTO: 0.21 10*3/MM3 (ref 0–0.4)
EOSINOPHIL NFR BLD AUTO: 1 % (ref 0.3–6.2)
EOSINOPHIL NFR BLD AUTO: 1.8 % (ref 0.3–6.2)
ERYTHROCYTE [DISTWIDTH] IN BLOOD BY AUTOMATED COUNT: 13.9 % (ref 12.3–15.4)
ERYTHROCYTE [DISTWIDTH] IN BLOOD BY AUTOMATED COUNT: 14 % (ref 12.3–15.4)
GLOBULIN UR ELPH-MCNC: 4.1 GM/DL
GLUCOSE SERPL-MCNC: 112 MG/DL (ref 65–99)
GLUCOSE SERPL-MCNC: 138 MG/DL (ref 65–99)
HCT VFR BLD AUTO: 30.9 % (ref 34–46.6)
HCT VFR BLD AUTO: 33 % (ref 34–46.6)
HGB BLD-MCNC: 10 G/DL (ref 12–15.9)
HGB BLD-MCNC: 10.9 G/DL (ref 12–15.9)
HOLD SPECIMEN: NORMAL
IMM GRANULOCYTES # BLD AUTO: 0.45 10*3/MM3 (ref 0–0.05)
IMM GRANULOCYTES # BLD AUTO: 0.62 10*3/MM3 (ref 0–0.05)
IMM GRANULOCYTES NFR BLD AUTO: 3.8 % (ref 0–0.5)
IMM GRANULOCYTES NFR BLD AUTO: 4.2 % (ref 0–0.5)
INR PPP: 1 (ref 0.93–1.1)
LIPASE SERPL-CCNC: 40 U/L (ref 13–60)
LYMPHOCYTES # BLD AUTO: 1.1 10*3/MM3 (ref 0.7–3.1)
LYMPHOCYTES # BLD AUTO: 1.25 10*3/MM3 (ref 0.7–3.1)
LYMPHOCYTES NFR BLD AUTO: 8.4 % (ref 19.6–45.3)
LYMPHOCYTES NFR BLD AUTO: 9.2 % (ref 19.6–45.3)
MAGNESIUM SERPL-MCNC: 2.3 MG/DL (ref 1.6–2.4)
MCH RBC QN AUTO: 28.2 PG (ref 26.6–33)
MCH RBC QN AUTO: 28.7 PG (ref 26.6–33)
MCHC RBC AUTO-ENTMCNC: 32.4 G/DL (ref 31.5–35.7)
MCHC RBC AUTO-ENTMCNC: 33 G/DL (ref 31.5–35.7)
MCV RBC AUTO: 86.8 FL (ref 79–97)
MCV RBC AUTO: 87 FL (ref 79–97)
MONOCYTES # BLD AUTO: 0.72 10*3/MM3 (ref 0.1–0.9)
MONOCYTES # BLD AUTO: 0.75 10*3/MM3 (ref 0.1–0.9)
MONOCYTES NFR BLD AUTO: 5 % (ref 5–12)
MONOCYTES NFR BLD AUTO: 6.1 % (ref 5–12)
NEUTROPHILS NFR BLD AUTO: 12.1 10*3/MM3 (ref 1.7–7)
NEUTROPHILS NFR BLD AUTO: 78.8 % (ref 42.7–76)
NEUTROPHILS NFR BLD AUTO: 81.1 % (ref 42.7–76)
NEUTROPHILS NFR BLD AUTO: 9.39 10*3/MM3 (ref 1.7–7)
NRBC BLD AUTO-RTO: 0 /100 WBC (ref 0–0.2)
NRBC BLD AUTO-RTO: 0 /100 WBC (ref 0–0.2)
PHOSPHATE SERPL-MCNC: 4.2 MG/DL (ref 2.5–4.5)
PLATELET # BLD AUTO: 423 10*3/MM3 (ref 140–450)
PLATELET # BLD AUTO: 484 10*3/MM3 (ref 140–450)
PMV BLD AUTO: 9.7 FL (ref 6–12)
PMV BLD AUTO: 9.8 FL (ref 6–12)
POTASSIUM SERPL-SCNC: 3.8 MMOL/L (ref 3.5–5.2)
POTASSIUM SERPL-SCNC: 3.9 MMOL/L (ref 3.5–5.2)
PROT SERPL-MCNC: 7.7 G/DL (ref 6–8.5)
PROTHROMBIN TIME: 10.9 SECONDS (ref 9.6–11.7)
RBC # BLD AUTO: 3.55 10*6/MM3 (ref 3.77–5.28)
RBC # BLD AUTO: 3.8 10*6/MM3 (ref 3.77–5.28)
SODIUM SERPL-SCNC: 130 MMOL/L (ref 136–145)
SODIUM SERPL-SCNC: 133 MMOL/L (ref 136–145)
WBC NRBC COR # BLD AUTO: 11.9 10*3/MM3 (ref 3.4–10.8)
WBC NRBC COR # BLD AUTO: 14.92 10*3/MM3 (ref 3.4–10.8)

## 2024-10-30 PROCEDURE — 96361 HYDRATE IV INFUSION ADD-ON: CPT

## 2024-10-30 PROCEDURE — 83735 ASSAY OF MAGNESIUM: CPT | Performed by: STUDENT IN AN ORGANIZED HEALTH CARE EDUCATION/TRAINING PROGRAM

## 2024-10-30 PROCEDURE — 25810000003 LACTATED RINGERS SOLUTION: Performed by: STUDENT IN AN ORGANIZED HEALTH CARE EDUCATION/TRAINING PROGRAM

## 2024-10-30 PROCEDURE — G0378 HOSPITAL OBSERVATION PER HR: HCPCS

## 2024-10-30 PROCEDURE — 25510000001 IOPAMIDOL PER 1 ML: Performed by: EMERGENCY MEDICINE

## 2024-10-30 PROCEDURE — 74177 CT ABD & PELVIS W/CONTRAST: CPT

## 2024-10-30 PROCEDURE — 96360 HYDRATION IV INFUSION INIT: CPT

## 2024-10-30 PROCEDURE — 84100 ASSAY OF PHOSPHORUS: CPT | Performed by: STUDENT IN AN ORGANIZED HEALTH CARE EDUCATION/TRAINING PROGRAM

## 2024-10-30 PROCEDURE — 85025 COMPLETE CBC W/AUTO DIFF WBC: CPT | Performed by: STUDENT IN AN ORGANIZED HEALTH CARE EDUCATION/TRAINING PROGRAM

## 2024-10-30 PROCEDURE — 85610 PROTHROMBIN TIME: CPT | Performed by: EMERGENCY MEDICINE

## 2024-10-30 PROCEDURE — 25010000002 HEPARIN (PORCINE) PER 1000 UNITS: Performed by: STUDENT IN AN ORGANIZED HEALTH CARE EDUCATION/TRAINING PROGRAM

## 2024-10-30 PROCEDURE — 80053 COMPREHEN METABOLIC PANEL: CPT | Performed by: EMERGENCY MEDICINE

## 2024-10-30 PROCEDURE — 99285 EMERGENCY DEPT VISIT HI MDM: CPT

## 2024-10-30 PROCEDURE — 85025 COMPLETE CBC W/AUTO DIFF WBC: CPT | Performed by: EMERGENCY MEDICINE

## 2024-10-30 PROCEDURE — 96372 THER/PROPH/DIAG INJ SC/IM: CPT

## 2024-10-30 PROCEDURE — 85730 THROMBOPLASTIN TIME PARTIAL: CPT | Performed by: EMERGENCY MEDICINE

## 2024-10-30 PROCEDURE — 83690 ASSAY OF LIPASE: CPT | Performed by: EMERGENCY MEDICINE

## 2024-10-30 PROCEDURE — 25810000003 SODIUM CHLORIDE 0.9 % SOLUTION: Performed by: EMERGENCY MEDICINE

## 2024-10-30 RX ORDER — IOPAMIDOL 755 MG/ML
100 INJECTION, SOLUTION INTRAVASCULAR
Status: COMPLETED | OUTPATIENT
Start: 2024-10-30 | End: 2024-10-30

## 2024-10-30 RX ORDER — HEPARIN SODIUM 5000 [USP'U]/ML
5000 INJECTION, SOLUTION INTRAVENOUS; SUBCUTANEOUS EVERY 8 HOURS SCHEDULED
Status: DISCONTINUED | OUTPATIENT
Start: 2024-10-30 | End: 2024-11-03 | Stop reason: HOSPADM

## 2024-10-30 RX ORDER — SODIUM CHLORIDE 0.9 % (FLUSH) 0.9 %
10 SYRINGE (ML) INJECTION AS NEEDED
Status: DISCONTINUED | OUTPATIENT
Start: 2024-10-30 | End: 2024-11-03 | Stop reason: HOSPADM

## 2024-10-30 RX ORDER — ONDANSETRON 4 MG/1
4 TABLET, ORALLY DISINTEGRATING ORAL EVERY 6 HOURS PRN
Status: DISCONTINUED | OUTPATIENT
Start: 2024-10-30 | End: 2024-11-03 | Stop reason: HOSPADM

## 2024-10-30 RX ORDER — DEXTROSE MONOHYDRATE, SODIUM CHLORIDE, AND POTASSIUM CHLORIDE 50; 1.49; 4.5 G/1000ML; G/1000ML; G/1000ML
100 INJECTION, SOLUTION INTRAVENOUS CONTINUOUS
Status: DISPENSED | OUTPATIENT
Start: 2024-10-30 | End: 2024-11-01

## 2024-10-30 RX ORDER — SODIUM CHLORIDE 0.9 % (FLUSH) 0.9 %
10 SYRINGE (ML) INJECTION EVERY 12 HOURS SCHEDULED
Status: DISCONTINUED | OUTPATIENT
Start: 2024-10-30 | End: 2024-10-31

## 2024-10-30 RX ORDER — ONDANSETRON 2 MG/ML
4 INJECTION INTRAMUSCULAR; INTRAVENOUS EVERY 6 HOURS PRN
Status: DISCONTINUED | OUTPATIENT
Start: 2024-10-30 | End: 2024-11-03 | Stop reason: HOSPADM

## 2024-10-30 RX ORDER — SCOLOPAMINE TRANSDERMAL SYSTEM 1 MG/1
1 PATCH, EXTENDED RELEASE TRANSDERMAL
Status: DISCONTINUED | OUTPATIENT
Start: 2024-10-30 | End: 2024-11-03 | Stop reason: HOSPADM

## 2024-10-30 RX ORDER — PANTOPRAZOLE SODIUM 40 MG/1
40 TABLET, DELAYED RELEASE ORAL DAILY
Status: DISCONTINUED | OUTPATIENT
Start: 2024-10-30 | End: 2024-11-03 | Stop reason: HOSPADM

## 2024-10-30 RX ORDER — SODIUM CHLORIDE 0.9 % (FLUSH) 0.9 %
20 SYRINGE (ML) INJECTION AS NEEDED
Status: DISCONTINUED | OUTPATIENT
Start: 2024-10-30 | End: 2024-11-03 | Stop reason: HOSPADM

## 2024-10-30 RX ORDER — MELOXICAM 15 MG/1
15 TABLET ORAL DAILY
Status: DISCONTINUED | OUTPATIENT
Start: 2024-10-30 | End: 2024-11-03 | Stop reason: HOSPADM

## 2024-10-30 RX ORDER — METHOCARBAMOL 500 MG/1
500 TABLET, FILM COATED ORAL EVERY 8 HOURS PRN
Status: DISCONTINUED | OUTPATIENT
Start: 2024-10-30 | End: 2024-11-03 | Stop reason: HOSPADM

## 2024-10-30 RX ORDER — OXYCODONE HYDROCHLORIDE 5 MG/1
5 TABLET ORAL EVERY 8 HOURS PRN
Status: DISCONTINUED | OUTPATIENT
Start: 2024-10-30 | End: 2024-11-03 | Stop reason: HOSPADM

## 2024-10-30 RX ADMIN — SERTRALINE 50 MG: 50 TABLET, FILM COATED ORAL at 18:04

## 2024-10-30 RX ADMIN — HEPARIN SODIUM 5000 UNITS: 5000 INJECTION INTRAVENOUS; SUBCUTANEOUS at 18:04

## 2024-10-30 RX ADMIN — PANTOPRAZOLE SODIUM 40 MG: 40 TABLET, DELAYED RELEASE ORAL at 18:04

## 2024-10-30 RX ADMIN — SODIUM CHLORIDE, SODIUM LACTATE, POTASSIUM CHLORIDE, AND CALCIUM CHLORIDE 500 ML: .6; .31; .03; .02 INJECTION, SOLUTION INTRAVENOUS at 18:05

## 2024-10-30 RX ADMIN — SCOPOLAMINE 1 PATCH: 1.5 PATCH, EXTENDED RELEASE TRANSDERMAL at 18:08

## 2024-10-30 RX ADMIN — POTASSIUM CHLORIDE, DEXTROSE MONOHYDRATE AND SODIUM CHLORIDE 100 ML/HR: 150; 5; 450 INJECTION, SOLUTION INTRAVENOUS at 18:05

## 2024-10-30 RX ADMIN — MELOXICAM 15 MG: 15 TABLET ORAL at 18:04

## 2024-10-30 RX ADMIN — IOPAMIDOL 100 ML: 755 INJECTION, SOLUTION INTRAVENOUS at 10:55

## 2024-10-30 RX ADMIN — Medication 10 ML: at 18:07

## 2024-10-30 RX ADMIN — HEPARIN SODIUM 5000 UNITS: 5000 INJECTION INTRAVENOUS; SUBCUTANEOUS at 22:35

## 2024-10-30 RX ADMIN — SODIUM CHLORIDE 500 ML: 9 INJECTION, SOLUTION INTRAVENOUS at 11:42

## 2024-10-30 NOTE — SIGNIFICANT NOTE
10/30/24 1727   Readmission Indications   Is the patient and/or family able to complete the readmission assessment questions? Yes   Is this hospitalization related to the prior hospital diagnosis? Yes   Recommendation for rehospitalization   Did you speak with your physician prior to coming to the hospital Yes   If yes, what physician did you speak with? Kyleigh   Follow-up Appointments   Do you have a PCP? Yes   Did you have an appointment with PCP after your hospitalization? Yes   When was your appointment scheduled? 11/06/24   Did you go to appointment?   (N/A - 11/6/24)   Did you have an appointment with a Specialist? Yes   When was your appointment scheduled? 11/01/24   Did you go to appointment?   (N/A- 11/1/24)   Are you current with the Pulmonary Clinic? No   Are you current with the CHF Clinic? No   Medications   Did you have newly prescribed medications at discharge? Yes   Did you understand the reasons for your medications at discharge and how to take them? Yes   Did you understand the side effects of your medications? Yes   Are you taking all of you prescribed medications? Yes   What pharmacy was used to fill prescription(s)? [facility]   Were medications picked up? Yes   Discharge Instructions   Did you understand your discharge instructions? Yes   Did your family/caregiver hear your instructions? Yes   Were you told to eat a special diet? No   Were you given a number of someone to call if you had questions or concerns? Yes   Index discharge location/services   Where did you go upon discharge? Home  (Home health was not set up due to insurance limitations (agencies that referrals were made to didn't accept patient's insurance coverage))   Do you have supportive family or friends in the home? Yes   What services were arranged at discharge? DME;Other (comment)  (Home health was not set up due to insurance limitations (agencies that referrals were made to didn't accept patient's insurance coverage))    Discharge Readiness   On a scale of 1-5 (5 being well prepared), how ready were you for discharge 4   Palliative Care/Hospice   Are you current with Palliative Care? No   Are you current with Hospice Care? No   Advance Directives (For Healthcare)   Pre-existing AND/MOST/POLST Order No   Advance Directive Status Patient does not have advance directive   Have you reviewed your Advance Directive and is it valid for this stay? Not applicable   Literature Provided on Advance Directives No   Patient Requests Assistance on Advance Directives Patient Declined   Readmission Assessment Final Comments   Final Comments 10/22-10/26 admitted for planned Robotic assisted laparoscopic total abdominal colectomy & Cystoscopy with ICG injection; mild hypotension post-op.Diet advanced, ileostomy education provided. Pt dc'd to home with family. 10/30: Pt readmitted with ileus. IV fluids started     Landy Licona RN, [facility]  Office: [phone]  Fax: [phone]  [email or web address]      I met with patient in room wearing PPE: mask    Maintained distance greater than six feet and spent </=15 minutes in the room

## 2024-10-30 NOTE — CASE MANAGEMENT/SOCIAL WORK
Continued Stay Note   [facility]     Patient Name: Mali Vann  MRN: [id number]  Today's Date: 10/30/2024    Admit Date: 10/30/2024    Plan: Home with spouse. Possible home health. Possible TPN/Lipids   Discharge Plan       Row Name 10/30/24 1837       Plan    Plan Home with spouse. Possible home health. Possible TPN/Lipids    Plan Comments Noted order for PICC line for TPN/Lipids, though no orders for these at this time.              Landy Licona RN, [facility]  Office: [phone]  Fax: [phone]  [email or web address]      Phone communication or documentation only - no physical contact with patient or family.    Landy Licona RN

## 2024-10-30 NOTE — H&P
Colorectal Surgery Consultation Note    ID:  Mali Vann;   : 1960  DATE OF VISIT: 10/30/2024    Chief Complaint  Post-op Problem       History of Present Illness  Mali Vann is a 64 y.o. 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  She was discharged 4 days ago however she has been feeling nauseous and had 2 episodes of vomiting.  She has been tolerating diet.  Colostomy output is significantly decreased for the last 2 days.  She denies any fevers or chills.    In the emergency room patient was noted to be hemodynamically stable.  Her lab was remarkable for white count of 11.90, hemoglobin of 10.0, potassium of 3.9, sodium 133.    CT abdomen pelvis shows small bowel dilatation consistent with ileus.  No intra-abdominal collection or abscess.     Past Medical History  Past Medical History:   Diagnosis Date    Cancer     Depression     Dyslipidemia     Elevated cholesterol     History of colon cancer     Prediabetes 2024     Past Surgical History  Past Surgical History:   Procedure Laterality Date    CHOLECYSTECTOMY      in [place]    COLON RESECTION N/A 10/22/2024    Procedure: LAPAROSCOPIC TOTAL COLECTOMY WITH DAVINCI ROBOT AND ILEOSTOMY;  Surgeon: Dickson Arizmendi MD;  Location: [facility] MAIN OR;  Service: Robotics - DaVinci;  Laterality: N/A;    COLON SURGERY      12-18 inches colon removed due to cancer; In [place], [place]    COLONOSCOPY      in [place], [place] Dr. Beck    COLONOSCOPY N/A 2024    Procedure: COLONOSCOPY with biopsy of cecal mass x3 areas,cold snare polypectomy x8.hot snare polypectomy x2 and tatooing x2 areas;  Surgeon: Dickson Arizmendi MD;  Location: [facility] ENDOSCOPY;  Service: General;  Laterality: N/A;  cecal mass,colon polyps,colon mass    CYSTOSCOPY N/A 10/22/2024    Procedure: CYSTOSCOPY;  Surgeon: Dickson Arizmendi MD;  Location: [facility] MAIN OR;  Service: Robotics - DaVinci;   Laterality: N/A;     Family History  Family History   Problem Relation Age of Onset    Hypertension Mother     Diabetes Father     Lung cancer Father         Associated to tobacco smoking.    Cancer Sister         The source of the malignancy was not clear but was metastatic.    Kidney cancer Maternal Grandmother     Lymphoma Maternal Grandfather      No colorectal cancer history in immediate family members.  Social History  Social History     Tobacco Use    Smoking status: Former     Current packs/day: 0.00     Average packs/day: 1 pack/day for 4.0 years (4.0 ttl pk-yrs)     Types: Cigarettes     Start date: 1977     Quit date: 1981     Years since quittin.0    Smokeless tobacco: Never   Vaping Use    Vaping status: Never Used   Substance Use Topics    Alcohol use: Not Currently    Drug use: Not Currently     Comment: I dont remember     Medication List  @medcurrent@  Allergies  Allergies   Allergen Reactions    Penicillins Rash     Review of Systems  All systems reviewed and are otherwise negative, pertinent positives noted in the HPI.    Physical Exam  General:  No acute distress  Head: Normocephalic, atraumatic  Neuro: Alert and oriented    Abdomen: Soft, nondistended, nontender, ileostomy and the right lower quadrant viable and intact with scant output in the bag.      Assessment  -Status post total colectomy with end ileostomy presenting with postoperative ileus.    Plan / Recommendations    1.  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    2.  Admit to the hospital for fluid resuscitation    3.  Will keep on clear liquid diet    4.  Antiemetic as needed    5.  Out of bed and ambulating.    6.  Will place PICC line patient      Dickson Arizmendi MD  Colon and Rectal Surgery   [facility]

## 2024-10-30 NOTE — ED PROVIDER NOTES
"Subjective   History of Present Illness  Chief complaint: Patient is a 64-year-old who presents with abdominal pain.  She has been vomiting.  This started yesterday and today.  She does not want to eat because \"I feel like I may have vomiting\".  She had adenocarcinoma and a colectomy with an ostomy performed on 22 October.  She was discharged on Saturday and doing well.  She is really not had any ostomy output over the last 24 hours however as the symptoms of started.  Her  does provide some history and states that he spoke with  her surgeon who directed them to the emergency department.  She has not had fever.    Context:    Duration:    Timing:    Severity:    Associated Symptoms:        PCP:  LMP:      Review of Systems   Constitutional:  Negative for fever.   Respiratory: Negative.     Cardiovascular: Negative.    Gastrointestinal:  Positive for abdominal pain, nausea and vomiting.   Genitourinary: Negative.        Past Medical History:   Diagnosis Date    Cancer     Depression     Dyslipidemia     Elevated cholesterol     History of colon cancer     Prediabetes 02/21/2024       Allergies   Allergen Reactions    Penicillins Rash       Past Surgical History:   Procedure Laterality Date    CHOLECYSTECTOMY  2021    in [place], [place]    COLON RESECTION N/A 10/22/2024    Procedure: LAPAROSCOPIC TOTAL COLECTOMY WITH DAVINCI ROBOT AND ILEOSTOMY;  Surgeon: Dickson Arizmendi MD;  Location: [facility] MAIN OR;  Service: Robotics - DaVinci;  Laterality: N/A;    COLON SURGERY  1990    12-18 inches colon removed due to cancer; In [place], [place]    COLONOSCOPY      in [place], [place] Dr. Beck    COLONOSCOPY N/A 9/18/2024    Procedure: COLONOSCOPY with biopsy of cecal mass x3 areas,cold snare polypectomy x8.hot snare polypectomy x2 and tatooing x2 areas;  Surgeon: Dickson Arizmendi MD;  Location: [facility] ENDOSCOPY;  Service: General;  Laterality: N/A;  cecal mass,colon polyps,colon mass    CYSTOSCOPY N/A 10/22/2024    Procedure: " CYSTOSCOPY;  Surgeon: Dickson Arizmendi MD;  Location: [facility] MAIN OR;  Service: Robotics - DaVinci;  Laterality: N/A;       Family History   Problem Relation Age of Onset    Hypertension Mother     Diabetes Father     Lung cancer Father         Associated to tobacco smoking.    Cancer Sister         The source of the malignancy was not clear but was metastatic.    Kidney cancer Maternal Grandmother     Lymphoma Maternal Grandfather        Social History     Socioeconomic History    Marital status:      Spouse name: Dilip    Number of children: 4    Highest education level: High school graduate   Tobacco Use    Smoking status: Former     Current packs/day: 0.00     Average packs/day: 1 pack/day for 4.0 years (4.0 ttl pk-yrs)     Types: Cigarettes     Start date: 1977     Quit date: 1981     Years since quittin.0    Smokeless tobacco: Never   Vaping Use    Vaping status: Never Used   Substance and Sexual Activity    Alcohol use: Not Currently    Drug use: Not Currently     Comment: I dont remember    Sexual activity: Yes     Partners: Male     Birth control/protection: Post-menopausal           Objective   Physical Exam  Vitals and nursing note reviewed.   Constitutional:       General: She is not in acute distress.  HENT:      Head: Normocephalic and atraumatic.   Cardiovascular:      Rate and Rhythm: Normal rate and regular rhythm.   Abdominal:      General: Abdomen is protuberant. Bowel sounds are decreased.      Palpations: Abdomen is soft.      Tenderness: There is abdominal tenderness. There is guarding. There is no rebound.   Musculoskeletal:      Cervical back: Normal range of motion.   Skin:     General: Skin is warm and dry.   Neurological:      General: No focal deficit present.      Mental Status: She is alert and oriented to person, place, and time.   Psychiatric:         Mood and Affect: Mood normal.         Thought Content: Thought content normal.         Procedures           ED  Course                                     Results for orders placed or performed during the hospital encounter of 10/30/24   Comprehensive Metabolic Panel    Collection Time: 10/30/24 10:05 AM    Specimen: Blood   Result Value Ref Range    Glucose 138 (H) 65 - 99 mg/dL    BUN 12 8 - 23 mg/dL    Creatinine 1.06 (H) 0.57 - 1.00 mg/dL    Sodium 130 (L) 136 - 145 mmol/L    Potassium 3.8 3.5 - 5.2 mmol/L    Chloride 90 (L) 98 - 107 mmol/L    CO2 23.6 22.0 - 29.0 mmol/L    Calcium 9.4 8.6 - 10.5 mg/dL    Total Protein 7.7 6.0 - 8.5 g/dL    Albumin 3.6 3.5 - 5.2 g/dL    ALT (SGPT) 97 (H) 1 - 33 U/L    AST (SGOT) 54 (H) 1 - 32 U/L    Alkaline Phosphatase 206 (H) 39 - 117 U/L    Total Bilirubin 0.8 0.0 - 1.2 mg/dL    Globulin 4.1 gm/dL    A/G Ratio 0.9 g/dL    BUN/Creatinine Ratio 11.3 7.0 - 25.0    Anion Gap 16.4 (H) 5.0 - 15.0 mmol/L    eGFR 58.8 (L) >60.0 mL/min/1.73   Lipase    Collection Time: 10/30/24 10:05 AM    Specimen: Blood   Result Value Ref Range    Lipase 40 13 - 60 U/L   Protime-INR    Collection Time: 10/30/24 10:05 AM    Specimen: Blood   Result Value Ref Range    Protime 10.9 9.6 - 11.7 Seconds    INR 1.00 0.93 - 1.10   aPTT    Collection Time: 10/30/24 10:05 AM    Specimen: Blood   Result Value Ref Range    PTT 25.6 (L) 61.0 - 76.5 seconds   CBC Auto Differential    Collection Time: 10/30/24 10:05 AM    Specimen: Blood   Result Value Ref Range    WBC 14.92 (H) 3.40 - 10.80 10*3/mm3    RBC 3.80 3.77 - 5.28 10*6/mm3    Hemoglobin 10.9 (L) 12.0 - 15.9 g/dL    Hematocrit 33.0 (L) 34.0 - 46.6 %    MCV 86.8 79.0 - 97.0 fL    MCH 28.7 26.6 - 33.0 pg    MCHC 33.0 31.5 - 35.7 g/dL    RDW 13.9 12.3 - 15.4 %    RDW-SD 43.4 37.0 - 54.0 fl    MPV 9.7 6.0 - 12.0 fL    Platelets 484 (H) 140 - 450 10*3/mm3    Neutrophil % 81.1 (H) 42.7 - 76.0 %    Lymphocyte % 8.4 (L) 19.6 - 45.3 %    Monocyte % 5.0 5.0 - 12.0 %    Eosinophil % 1.0 0.3 - 6.2 %    Basophil % 0.3 0.0 - 1.5 %    Immature Grans % 4.2 (H) 0.0 - 0.5 %     Neutrophils, Absolute 12.10 (H) 1.70 - 7.00 10*3/mm3    Lymphocytes, Absolute 1.25 0.70 - 3.10 10*3/mm3    Monocytes, Absolute 0.75 0.10 - 0.90 10*3/mm3    Eosinophils, Absolute 0.15 0.00 - 0.40 10*3/mm3    Basophils, Absolute 0.05 0.00 - 0.20 10*3/mm3    Immature Grans, Absolute 0.62 (H) 0.00 - 0.05 10*3/mm3    nRBC 0.0 0.0 - 0.2 /100 WBC   Gold Top - SST    Collection Time: 10/30/24 10:05 AM   Result Value Ref Range    Extra Tube Hold for add-ons.            CT Abdomen Pelvis With Contrast    Result Date: 10/30/2024  1. Subtotal colectomy with right lower quadrant ileostomy. 2. Multiple dilated small bowel loops, with some distal small bowel fecalization, and distal ileal decompressed bowel proximal to the stoma. Constellation of findings is favored to represent postoperative ileus. No well-defined transition zone is seen to  suggest a high-grade small bowel obstruction on this study. 3. Trace pelvic free fluid. No well-defined abscess collection is identified. 4. Hepatic steatosis. Electronically Signed: Joann Pearce MD  10/30/2024 11:09 AM EDT  Workstation ID: [id number]       Medical Decision Making  Patient was seen evaluate for the above problem    Differential diagnosis includes was not limited to bowel obstruction, ileus, bowel perforation, abscess    Patient has no fever.  White count was mildly elevated.  She did have diminished bowel sounds.  She was sent for CT scan.  This reviewed by myself shows an ileus.  No definite obstruction at this point in time.  Her creatinine is mildly elevated compared to prior.  And she has not been taking good p.o. and over the last couple of days that she has been nauseous and vomiting.  I spoke with  who would like the patient admitted to his service.  Findings discussed with patient.  She did receive IV fluids here in the emergency department.    Problems Addressed:  TWILA (acute kidney injury): complicated acute illness or injury  Postoperative ileus: complicated  acute illness or injury    Amount and/or Complexity of Data Reviewed  Labs: ordered. Decision-making details documented in ED Course.     Details: Labs reviewed by myself  Radiology: ordered and independent interpretation performed.     Details: CT reviewed by myself as above    Risk  Prescription drug management.  Decision regarding hospitalization.        Final diagnoses:   None   Postoperative ileus  Norbert      ED Disposition  ED Disposition       None            No follow-up provider specified.       Medication List      No changes were made to your prescriptions during this visit.            Joseph Bolivar,   10/30/24 1215

## 2024-10-30 NOTE — CASE MANAGEMENT/SOCIAL WORK
Discharge Planning Assessment   [facility]     Patient Name: Mali Vann  MRN: [id number]  Today's Date: 10/30/2024    Admit Date: 10/30/2024    Plan: Home with spouse. Possible home health   Discharge Needs Assessment       Row Name 10/30/24 1640       Living Environment    People in Home spouse    Name(s) of People in Home Dilip    Current Living Arrangements home    Potentially Unsafe Housing Conditions none    In the past 12 months has the electric, gas, oil, or water company threatened to shut off services in your home? No    Primary Care Provided by self;spouse/significant other    Provides Primary Care For no one    Family Caregiver if Needed spouse    Family Caregiver Names Dilip    Quality of Family Relationships supportive;helpful;involved    Able to Return to Prior Arrangements yes       Resource/Environmental Concerns    Resource/Environmental Concerns none    Transportation Concerns none       Transportation Needs    In the past 12 months, has lack of transportation kept you from medical appointments or from getting medications? no    In the past 12 months, has lack of transportation kept you from meetings, work, or from getting things needed for daily living? No       Food Insecurity    Within the past 12 months, you worried that your food would run out before you got the money to buy more. Never true    Within the past 12 months, the food you bought just didn't last and you didn't have money to get more. Never true       Transition Planning    Patient/Family Anticipates Transition to home with family    Patient/Family Anticipated Services at Transition none    Transportation Anticipated family or friend will provide       Discharge Needs Assessment    Readmission Within the Last 30 Days previous discharge plan unsuccessful    Equipment Currently Used at Home walker, rolling    Concerns to be Addressed discharge planning    Anticipated Changes Related to Illness none    Equipment Needed After Discharge  none    Outpatient/Agency/Support Group Needs homecare agency                   Discharge Plan       Row Name 10/30/24 1643       Plan    Plan Home with spouse. Possible home health    Patient/Family in Agreement with Plan yes    Plan Comments  spoke with patient and spouse Dilip at bedside. Pt is normally independent with all ADLs, but spouse has been managing household needs since discharge. She confirms pcp and pharmacy and denies difficulty obtaining food/medication/transportation. Spouse confirms he will provide transportation at discharge. He has been assisting with her ileostomy care. He reports he has ordered suppleis from [employer]. Pt reports she has not had home health services. It appears that the referrals that were made on previous admission were declined due to not accepting her insurance. Unknown at this time if patient will still need home health services at this discharge. Pt plans to return home at discharge. DC Barriers: IV fluids                    Demographic Summary       Row Name 10/30/24 1638       General Information    Admission Type inpatient    Arrived From home    Referral Source admission list;high risk screening    Reason for Consult discharge planning    Preferred Language English       Contact Information    Permission Granted to Share Info With                    Functional Status       Row Name 10/30/24 1639       Functional Status    Usual Activity Tolerance good    Current Activity Tolerance moderate       Functional Status, IADL    Medications independent    Meal Preparation assistive person    Housekeeping assistive person    Laundry assistive person    Shopping assistive person    IADL Comments Pt normally independent with all ADLs, but spouse has been assisting with household needs since patient had surgery               Landy Licona RN, [facility]  Office: [phone]  Fax: [phone]  [email or web address]      I met with patient in room wearing  PPE: mask    Maintained distance greater than six feet and spent </=15 minutes in the room    Landy Licona RN

## 2024-10-30 NOTE — Clinical Note
Level of Care: Med/Surg [1]   Admitting Physician: BEN OCHOA [[id number]]   Attending Physician: BEN OCHOA [[id number]]

## 2024-10-31 LAB
ANION GAP SERPL CALCULATED.3IONS-SCNC: 9.3 MMOL/L (ref 5–15)
BASOPHILS # BLD AUTO: 0.05 10*3/MM3 (ref 0–0.2)
BASOPHILS NFR BLD AUTO: 0.4 % (ref 0–1.5)
BUN SERPL-MCNC: 12 MG/DL (ref 8–23)
BUN/CREAT SERPL: 17.4 (ref 7–25)
CALCIUM SPEC-SCNC: 8.6 MG/DL (ref 8.6–10.5)
CHLORIDE SERPL-SCNC: 96 MMOL/L (ref 98–107)
CO2 SERPL-SCNC: 27.7 MMOL/L (ref 22–29)
CREAT SERPL-MCNC: 0.69 MG/DL (ref 0.57–1)
DEPRECATED RDW RBC AUTO: 44.7 FL (ref 37–54)
EGFRCR SERPLBLD CKD-EPI 2021: 97.1 ML/MIN/1.73
EOSINOPHIL # BLD AUTO: 0.26 10*3/MM3 (ref 0–0.4)
EOSINOPHIL NFR BLD AUTO: 2 % (ref 0.3–6.2)
ERYTHROCYTE [DISTWIDTH] IN BLOOD BY AUTOMATED COUNT: 13.9 % (ref 12.3–15.4)
GLUCOSE SERPL-MCNC: 148 MG/DL (ref 65–99)
HCT VFR BLD AUTO: 28.5 % (ref 34–46.6)
HGB BLD-MCNC: 9.4 G/DL (ref 12–15.9)
IMM GRANULOCYTES # BLD AUTO: 0.34 10*3/MM3 (ref 0–0.05)
IMM GRANULOCYTES NFR BLD AUTO: 2.6 % (ref 0–0.5)
LYMPHOCYTES # BLD AUTO: 1.33 10*3/MM3 (ref 0.7–3.1)
LYMPHOCYTES NFR BLD AUTO: 10.1 % (ref 19.6–45.3)
MAGNESIUM SERPL-MCNC: 2.5 MG/DL (ref 1.6–2.4)
MCH RBC QN AUTO: 28.9 PG (ref 26.6–33)
MCHC RBC AUTO-ENTMCNC: 33 G/DL (ref 31.5–35.7)
MCV RBC AUTO: 87.7 FL (ref 79–97)
MONOCYTES # BLD AUTO: 0.7 10*3/MM3 (ref 0.1–0.9)
MONOCYTES NFR BLD AUTO: 5.3 % (ref 5–12)
NEUTROPHILS NFR BLD AUTO: 10.46 10*3/MM3 (ref 1.7–7)
NEUTROPHILS NFR BLD AUTO: 79.6 % (ref 42.7–76)
NRBC BLD AUTO-RTO: 0 /100 WBC (ref 0–0.2)
PHOSPHATE SERPL-MCNC: 4.7 MG/DL (ref 2.5–4.5)
PLATELET # BLD AUTO: 395 10*3/MM3 (ref 140–450)
PMV BLD AUTO: 9.5 FL (ref 6–12)
POTASSIUM SERPL-SCNC: 4 MMOL/L (ref 3.5–5.2)
RBC # BLD AUTO: 3.25 10*6/MM3 (ref 3.77–5.28)
SODIUM SERPL-SCNC: 133 MMOL/L (ref 136–145)
WBC NRBC COR # BLD AUTO: 13.14 10*3/MM3 (ref 3.4–10.8)

## 2024-10-31 PROCEDURE — 85025 COMPLETE CBC W/AUTO DIFF WBC: CPT | Performed by: STUDENT IN AN ORGANIZED HEALTH CARE EDUCATION/TRAINING PROGRAM

## 2024-10-31 PROCEDURE — G0378 HOSPITAL OBSERVATION PER HR: HCPCS

## 2024-10-31 PROCEDURE — 84100 ASSAY OF PHOSPHORUS: CPT | Performed by: STUDENT IN AN ORGANIZED HEALTH CARE EDUCATION/TRAINING PROGRAM

## 2024-10-31 PROCEDURE — 83735 ASSAY OF MAGNESIUM: CPT | Performed by: STUDENT IN AN ORGANIZED HEALTH CARE EDUCATION/TRAINING PROGRAM

## 2024-10-31 PROCEDURE — C1751 CATH, INF, PER/CENT/MIDLINE: HCPCS

## 2024-10-31 PROCEDURE — 80048 BASIC METABOLIC PNL TOTAL CA: CPT | Performed by: STUDENT IN AN ORGANIZED HEALTH CARE EDUCATION/TRAINING PROGRAM

## 2024-10-31 PROCEDURE — 96372 THER/PROPH/DIAG INJ SC/IM: CPT

## 2024-10-31 PROCEDURE — 96361 HYDRATE IV INFUSION ADD-ON: CPT

## 2024-10-31 PROCEDURE — 25010000002 HEPARIN (PORCINE) PER 1000 UNITS: Performed by: STUDENT IN AN ORGANIZED HEALTH CARE EDUCATION/TRAINING PROGRAM

## 2024-10-31 RX ORDER — SODIUM CHLORIDE 0.9 % (FLUSH) 0.9 %
10 SYRINGE (ML) INJECTION EVERY 12 HOURS SCHEDULED
Status: DISCONTINUED | OUTPATIENT
Start: 2024-10-31 | End: 2024-11-03 | Stop reason: HOSPADM

## 2024-10-31 RX ORDER — SODIUM CHLORIDE 0.9 % (FLUSH) 0.9 %
20 SYRINGE (ML) INJECTION AS NEEDED
Status: DISCONTINUED | OUTPATIENT
Start: 2024-10-31 | End: 2024-11-03 | Stop reason: HOSPADM

## 2024-10-31 RX ORDER — SODIUM CHLORIDE 0.9 % (FLUSH) 0.9 %
10 SYRINGE (ML) INJECTION AS NEEDED
Status: DISCONTINUED | OUTPATIENT
Start: 2024-10-31 | End: 2024-11-03 | Stop reason: HOSPADM

## 2024-10-31 RX ORDER — SODIUM CHLORIDE 9 MG/ML
40 INJECTION, SOLUTION INTRAVENOUS AS NEEDED
Status: DISCONTINUED | OUTPATIENT
Start: 2024-10-31 | End: 2024-11-03 | Stop reason: HOSPADM

## 2024-10-31 RX ORDER — PROCHLORPERAZINE EDISYLATE 5 MG/ML
5 INJECTION INTRAMUSCULAR; INTRAVENOUS EVERY 6 HOURS PRN
Status: DISCONTINUED | OUTPATIENT
Start: 2024-10-31 | End: 2024-11-03 | Stop reason: HOSPADM

## 2024-10-31 RX ADMIN — Medication 10 ML: at 21:55

## 2024-10-31 RX ADMIN — SERTRALINE 50 MG: 50 TABLET, FILM COATED ORAL at 08:57

## 2024-10-31 RX ADMIN — PANTOPRAZOLE SODIUM 40 MG: 40 TABLET, DELAYED RELEASE ORAL at 08:57

## 2024-10-31 RX ADMIN — HEPARIN SODIUM 5000 UNITS: 5000 INJECTION INTRAVENOUS; SUBCUTANEOUS at 05:19

## 2024-10-31 RX ADMIN — HEPARIN SODIUM 5000 UNITS: 5000 INJECTION INTRAVENOUS; SUBCUTANEOUS at 15:15

## 2024-10-31 RX ADMIN — Medication 10 ML: at 08:58

## 2024-10-31 RX ADMIN — MELOXICAM 15 MG: 15 TABLET ORAL at 08:57

## 2024-10-31 RX ADMIN — METHOCARBAMOL 500 MG: 500 TABLET ORAL at 21:58

## 2024-10-31 RX ADMIN — POTASSIUM CHLORIDE, DEXTROSE MONOHYDRATE AND SODIUM CHLORIDE 100 ML/HR: 150; 5; 450 INJECTION, SOLUTION INTRAVENOUS at 05:48

## 2024-10-31 RX ADMIN — POTASSIUM CHLORIDE, DEXTROSE MONOHYDRATE AND SODIUM CHLORIDE 100 ML/HR: 150; 5; 450 INJECTION, SOLUTION INTRAVENOUS at 15:15

## 2024-10-31 RX ADMIN — HEPARIN SODIUM 5000 UNITS: 5000 INJECTION INTRAVENOUS; SUBCUTANEOUS at 21:55

## 2024-10-31 NOTE — CONSULTS
PICC Line Insertion Procedure Note    Procedure: Insertion of #5 FR/16G PICC    Indications:  TPN    Active Time Out:  Correct patient: Yes  Correct procedure: Yes  Correct site: Yes  Verified with: primary rn  Procedure Details:  Informed consent was obtained for the procedure.  Risk include, but are not limited to infection, air embolism, catheter tip moving, catheter blockage and phlebitis.     Maximum sterile technique was used including antiseptics, cap, gloves, gown, hand hygiene, mask, and sheet.    Ultrasound Guidance: Yes    #5 FR/16G PICC inserted to the R Basilic vein per hospital protocol by pillo denise RN.   Non-pulsatile blood return: yes    Lot #: [id number]  Expiration date: 08/31/2025    Complications:  none    Findings:  Catheter inserted to 41 cm, with 0 cm exposed.   Mid upper arm circumference is 32 cm.   Catheter was flushed with 20 cc NS and sterile dressing applied.  Patient tolerated procedure well.  PICC tip verified by:       [x] Sapiens 3cg       [] Chest X-ray    Recommendations:  Verbal and/or written Care/Maintenance instructions provided to patient.   Primary nurse notified.    Pillo Denise RN  10/31/24  10:37 EDT

## 2024-10-31 NOTE — OUTREACH NOTE
General Surgery Week 2 Survey      Flowsheet Row Responses   [facility] facility patient discharged from? [facility]   Does the patient have one of the following disease processes/diagnoses(primary or secondary)? General Surgery   Week 2 attempt successful? No   Unsuccessful attempts Attempt 1   Revoke Readmitted            Nan GRACIA - Registered Nurse

## 2024-10-31 NOTE — NURSING NOTE
WOCN note:    64 yr old female admitted 10/30/24 with abdominal pain and vomiting. Patient underwent a robot assisted laparoscopic total colectomy and ileostomy on 10/22/24 for colon cancer. She was discharged home on 10/26/24. Spouse reports she had good output from the ostomy until 3 days ago.     WOCN consult received to place a red rubber catheter via stoma. The old pouch was removed. There was a small amount of brown liquid, mucus and very thin pieces of soft brown stool in the pouch. Erythema is noted to the devi-stomal skin. The stoma is pink, moist and budded. The devi-stomal skin was treated with skin prep and stoma powder. An slim Adapt ring was applied and a flat 2 piece barrier.   The stoma was digitized without difficulty. A 14 Fr red rubber catheter was lubricated and inserted approximately 5-6 cm until resistance was met. Attempted to advance the catheter again and was able to pass without difficulty. No output from the catheter or the stoma. The catheter was placed into the pouch and secured to the barrier. Dr. Arizmendi was notified. We will continue to follow.

## 2024-10-31 NOTE — PLAN OF CARE
Goal Outcome Evaluation: Pt ambulated to bathroom tolerated well, NAHOMI drain removed, pt up in chair for multiple hours tolerated well, family at bedside, call light within reach, care on going

## 2024-10-31 NOTE — PROGRESS NOTES
Colorectal Surgery Progress Note    Pt. Name/Age/:  Mali Vann   64 y.o.    1960         Med. Record Number:   [id number]  Date of admission:  10/30/2024      Service(s): Colorectal Surgery      Subjective  Continues to feel nauseas  Had one episode of emesis    Remains afebrile and normotensive   Scant ostomy output     Objective  Vitals:     Patient Vitals for the past 24 hrs:   BP Temp Temp src Pulse Resp SpO2 Weight   10/31/24 1113 136/75 98.3 °F (36.8 °C) Oral 60 12 97 % --   10/31/24 0804 108/68 98.4 °F (36.9 °C) Oral -- 17 97 % --   10/31/24 0507 -- 98.2 °F (36.8 °C) Oral 63 12 97 % --   10/31/24 0013 113/73 98.4 °F (36.9 °C) Oral 60 16 97 % --   10/30/24 1952 124/75 97.6 °F (36.4 °C) Oral 79 16 98 % 87.5 kg (192 lb 14.4 oz)   10/30/24 1655 103/60 -- -- 73 16 -- --   10/30/24 1500 -- 99.1 °F (37.3 °C) Oral -- -- -- --   10/30/24 1341 -- -- -- 67 -- 98 % --   10/30/24 1331 112/61 -- -- 66 -- 97 % --           Wt. Admission: Weight: 93 kg (205 lb 0.4 oz)     Wt. Current: Weight: 87.5 kg (192 lb 14.4 oz)   Body mass index is 27.68 kg/m².      I&O:  Intake/Output Summary (Last 24 hours) at 10/31/2024 1319  Last data filed at 10/31/2024 1245  Gross per 24 hour   Intake 240 ml   Output 60 ml   Net 180 ml     10/29 190 - 10/31 0700  In: -   Out: 60 [Drains:60]      Exam  General:  No acute distress, resting comfortably.  Cardiovascular: regular rate.  Respiratory: no increased work of breathing.  Abdomen:  Soft, appropriate incisional tenderness, non-distended. No diffuse guarding or rebound tenderness. Ostomy with scant fluid output   Extremities: warm, well-perfused extremities, no pitting edema.      Labs:     [unfilled]          Scheduled Meds:heparin (porcine), 5,000 Units, Subcutaneous, Q8H  meloxicam, 15 mg, Oral, Daily  pantoprazole, 40 mg, Oral, Daily  Scopolamine, 1 patch, Transdermal, Q72H  sertraline, 50 mg, Oral, Daily  sodium chloride, 10 mL, Intravenous, Q12H  sodium chloride, 10 mL,  Intravenous, Q12H      Continuous Infusions:dextrose 5 % and sodium chloride 0.45 % with KCl 20 mEq/L, 100 mL/hr, Last Rate: 100 mL/hr (10/31/24 0548)      PRN Meds:.  Calcium Replacement - Follow Nurse / BPA Driven Protocol    Magnesium Standard Dose Replacement - Follow Nurse / BPA Driven Protocol    methocarbamol    ondansetron ODT **OR** ondansetron    oxyCODONE    Phosphorus Replacement - Follow Nurse / BPA Driven Protocol    Potassium Replacement - Follow Nurse / BPA Driven Protocol    [COMPLETED] Insert Peripheral IV **AND** sodium chloride    sodium chloride    sodium chloride    sodium chloride          Assessment  64 y.o. female Status post total colectomy with end ileostomy presenting with postoperative ileus.     Plan / Recommendations    - we will request ostomy nurses to place red rubber catheter through stoma and irrigate   - continue with fluids  - remove surgical drain   - out of bed and ambulate   - continue with sips of liquid diet       13:19 EDT; 10/31/2024        Dickson Arizmendi MD  Colon and Rectal Surgery   [facility]

## 2024-10-31 NOTE — PLAN OF CARE
Goal Outcome Evaluation:         Pt admitted from ED. Pt had an episode of emesis on her way up from ED. PRN meds offered and pt stated she felt better. IVF continued. Pt resting in bed. No output from ileostomy.

## 2024-11-01 PROBLEM — K56.7 ILEUS: Status: ACTIVE | Noted: 2024-11-01

## 2024-11-01 LAB
ALBUMIN SERPL-MCNC: 3.4 G/DL (ref 3.5–5.2)
ALBUMIN/GLOB SERPL: 1.1 G/DL
ALP SERPL-CCNC: 144 U/L (ref 39–117)
ALT SERPL W P-5'-P-CCNC: 57 U/L (ref 1–33)
ANION GAP SERPL CALCULATED.3IONS-SCNC: 12.1 MMOL/L (ref 5–15)
AST SERPL-CCNC: 39 U/L (ref 1–32)
BASOPHILS # BLD AUTO: 0.06 10*3/MM3 (ref 0–0.2)
BASOPHILS NFR BLD AUTO: 0.5 % (ref 0–1.5)
BILIRUB SERPL-MCNC: 0.5 MG/DL (ref 0–1.2)
BUN SERPL-MCNC: 10 MG/DL (ref 8–23)
BUN/CREAT SERPL: 12.2 (ref 7–25)
CALCIUM SPEC-SCNC: 8.4 MG/DL (ref 8.6–10.5)
CHLORIDE SERPL-SCNC: 99 MMOL/L (ref 98–107)
CO2 SERPL-SCNC: 23.9 MMOL/L (ref 22–29)
CREAT SERPL-MCNC: 0.82 MG/DL (ref 0.57–1)
DEPRECATED RDW RBC AUTO: 45.1 FL (ref 37–54)
EGFRCR SERPLBLD CKD-EPI 2021: 80 ML/MIN/1.73
EOSINOPHIL # BLD AUTO: 0.29 10*3/MM3 (ref 0–0.4)
EOSINOPHIL NFR BLD AUTO: 2.3 % (ref 0.3–6.2)
ERYTHROCYTE [DISTWIDTH] IN BLOOD BY AUTOMATED COUNT: 14 % (ref 12.3–15.4)
GLOBULIN UR ELPH-MCNC: 3.2 GM/DL
GLUCOSE SERPL-MCNC: 146 MG/DL (ref 65–99)
HCT VFR BLD AUTO: 29 % (ref 34–46.6)
HGB BLD-MCNC: 9.5 G/DL (ref 12–15.9)
IMM GRANULOCYTES # BLD AUTO: 0.28 10*3/MM3 (ref 0–0.05)
IMM GRANULOCYTES NFR BLD AUTO: 2.2 % (ref 0–0.5)
LYMPHOCYTES # BLD AUTO: 1.48 10*3/MM3 (ref 0.7–3.1)
LYMPHOCYTES NFR BLD AUTO: 11.6 % (ref 19.6–45.3)
MAGNESIUM SERPL-MCNC: 2.5 MG/DL (ref 1.6–2.4)
MCH RBC QN AUTO: 29.1 PG (ref 26.6–33)
MCHC RBC AUTO-ENTMCNC: 32.8 G/DL (ref 31.5–35.7)
MCV RBC AUTO: 89 FL (ref 79–97)
MONOCYTES # BLD AUTO: 0.68 10*3/MM3 (ref 0.1–0.9)
MONOCYTES NFR BLD AUTO: 5.3 % (ref 5–12)
NEUTROPHILS NFR BLD AUTO: 10 10*3/MM3 (ref 1.7–7)
NEUTROPHILS NFR BLD AUTO: 78.1 % (ref 42.7–76)
NRBC BLD AUTO-RTO: 0 /100 WBC (ref 0–0.2)
PHOSPHATE SERPL-MCNC: 3.6 MG/DL (ref 2.5–4.5)
PLATELET # BLD AUTO: 433 10*3/MM3 (ref 140–450)
PMV BLD AUTO: 9.4 FL (ref 6–12)
POTASSIUM SERPL-SCNC: 4.1 MMOL/L (ref 3.5–5.2)
PROT SERPL-MCNC: 6.6 G/DL (ref 6–8.5)
RBC # BLD AUTO: 3.26 10*6/MM3 (ref 3.77–5.28)
SODIUM SERPL-SCNC: 135 MMOL/L (ref 136–145)
WBC NRBC COR # BLD AUTO: 12.79 10*3/MM3 (ref 3.4–10.8)

## 2024-11-01 PROCEDURE — 96361 HYDRATE IV INFUSION ADD-ON: CPT

## 2024-11-01 PROCEDURE — 83735 ASSAY OF MAGNESIUM: CPT | Performed by: STUDENT IN AN ORGANIZED HEALTH CARE EDUCATION/TRAINING PROGRAM

## 2024-11-01 PROCEDURE — 84100 ASSAY OF PHOSPHORUS: CPT | Performed by: STUDENT IN AN ORGANIZED HEALTH CARE EDUCATION/TRAINING PROGRAM

## 2024-11-01 PROCEDURE — 96372 THER/PROPH/DIAG INJ SC/IM: CPT

## 2024-11-01 PROCEDURE — 25010000002 HEPARIN (PORCINE) PER 1000 UNITS: Performed by: STUDENT IN AN ORGANIZED HEALTH CARE EDUCATION/TRAINING PROGRAM

## 2024-11-01 PROCEDURE — 85025 COMPLETE CBC W/AUTO DIFF WBC: CPT | Performed by: STUDENT IN AN ORGANIZED HEALTH CARE EDUCATION/TRAINING PROGRAM

## 2024-11-01 PROCEDURE — 80053 COMPREHEN METABOLIC PANEL: CPT | Performed by: STUDENT IN AN ORGANIZED HEALTH CARE EDUCATION/TRAINING PROGRAM

## 2024-11-01 PROCEDURE — G0378 HOSPITAL OBSERVATION PER HR: HCPCS

## 2024-11-01 RX ADMIN — SERTRALINE 50 MG: 50 TABLET, FILM COATED ORAL at 08:14

## 2024-11-01 RX ADMIN — Medication 10 ML: at 08:15

## 2024-11-01 RX ADMIN — HEPARIN SODIUM 5000 UNITS: 5000 INJECTION INTRAVENOUS; SUBCUTANEOUS at 06:20

## 2024-11-01 RX ADMIN — PANTOPRAZOLE SODIUM 40 MG: 40 TABLET, DELAYED RELEASE ORAL at 01:42

## 2024-11-01 RX ADMIN — Medication 10 ML: at 08:17

## 2024-11-01 RX ADMIN — Medication 10 ML: at 21:00

## 2024-11-01 RX ADMIN — HEPARIN SODIUM 5000 UNITS: 5000 INJECTION INTRAVENOUS; SUBCUTANEOUS at 21:04

## 2024-11-01 RX ADMIN — HEPARIN SODIUM 5000 UNITS: 5000 INJECTION INTRAVENOUS; SUBCUTANEOUS at 14:27

## 2024-11-01 RX ADMIN — POTASSIUM CHLORIDE, DEXTROSE MONOHYDRATE AND SODIUM CHLORIDE 100 ML/HR: 150; 5; 450 INJECTION, SOLUTION INTRAVENOUS at 01:42

## 2024-11-01 NOTE — CASE MANAGEMENT/SOCIAL WORK
Continued Stay Note  [facility]     Patient Name: Mali Vann  MRN: [id number]  Today's Date: 11/1/2024    Admit Date: 10/30/2024    Plan: Home with spouse.  Family can transport at discharge.   Discharge Plan       Row Name 11/01/24 1429       Plan    Plan Home with spouse.  Family can transport at discharge.    Plan Comments Dc barriers: evaluation of ostomy, monitoring labs                             Expected Discharge Date and Time       Expected Discharge Date Expected Discharge Time    Nov 2, 2024             Mayra Gonzalez RN    [facility]  [email or web address]  Office [phone]  Cell [phone]

## 2024-11-01 NOTE — PLAN OF CARE
Goal Outcome Evaluation:         Ostomy bag changed. Pt had a very large amount of output from ostomy. Irrigation tube no longer in place. Blockage has seemed to pass.

## 2024-11-01 NOTE — PLAN OF CARE
Problem: Adult Inpatient Plan of Care  Goal: Plan of Care Review  Outcome: Progressing  Flowsheets (Taken 11/1/2024 1433)  Progress: improving  Outcome Evaluation: Patient is stable and no complaints of pain. tolerating clear liquids and good output in ostomy. Safety measures in place. Call light within reach. Patient able to make needs known. Plan of care ongoing.  Plan of Care Reviewed With:   patient   spouse  Goal: Patient-Specific Goal (Individualized)  Outcome: Progressing  Goal: Absence of Hospital-Acquired Illness or Injury  Outcome: Progressing  Intervention: Identify and Manage Fall Risk  Recent Flowsheet Documentation  Taken 11/1/2024 1433 by Ambika Monroe LPN  Safety Promotion/Fall Prevention:   activity supervised   assistive device/personal items within reach   clutter free environment maintained   nonskid shoes/slippers when out of bed   room organization consistent   safety round/check completed  Taken 11/1/2024 1232 by Ambika Monroe LPN  Safety Promotion/Fall Prevention:   activity supervised   assistive device/personal items within reach   clutter free environment maintained   nonskid shoes/slippers when out of bed   room organization consistent   safety round/check completed  Taken 11/1/2024 1014 by Ambika Monroe LPN  Safety Promotion/Fall Prevention:   activity supervised   assistive device/personal items within reach   clutter free environment maintained   nonskid shoes/slippers when out of bed   safety round/check completed   room organization consistent  Taken 11/1/2024 0817 by Ambika Monroe LPN  Safety Promotion/Fall Prevention:   activity supervised   assistive device/personal items within reach   clutter free environment maintained   nonskid shoes/slippers when out of bed   room organization consistent   safety round/check completed  Intervention: Prevent and Manage VTE (Venous Thromboembolism) Risk  Recent Flowsheet Documentation  Taken 11/1/2024 0817 by Ambika Monroe LPN  VTE  Prevention/Management:   bilateral   SCDs (sequential compression devices) off   patient refused intervention  Intervention: Prevent Infection  Recent Flowsheet Documentation  Taken 11/1/2024 1433 by Ambika Monroe LPN  Infection Prevention:   hand hygiene promoted   personal protective equipment utilized   single patient room provided  Taken 11/1/2024 1232 by Ambika Monroe LPN  Infection Prevention:   hand hygiene promoted   personal protective equipment utilized   single patient room provided  Taken 11/1/2024 1014 by Ambika Monroe LPN  Infection Prevention:   hand hygiene promoted   personal protective equipment utilized   single patient room provided  Taken 11/1/2024 0817 by Ambika Monroe LPN  Infection Prevention:   hand hygiene promoted   personal protective equipment utilized   single patient room provided  Goal: Optimal Comfort and Wellbeing  Outcome: Progressing  Intervention: Provide Person-Centered Care  Recent Flowsheet Documentation  Taken 11/1/2024 0817 by Ambika Monroe LPN  Trust Relationship/Rapport:   care explained   questions answered   questions encouraged   reassurance provided   thoughts/feelings acknowledged  Goal: Readiness for Transition of Care  Outcome: Progressing     Problem: Comorbidity Management  Goal: Blood Pressure in Desired Range  Outcome: Progressing  Intervention: Maintain Blood Pressure Management  Recent Flowsheet Documentation  Taken 11/1/2024 1433 by Ambika Monroe LPN  Medication Review/Management: medications reviewed  Taken 11/1/2024 1232 by Ambika Monroe LPN  Medication Review/Management: medications reviewed  Taken 11/1/2024 1014 by Ambika Monroe LPN  Medication Review/Management: medications reviewed  Taken 11/1/2024 0817 by Ambika Monroe LPN  Medication Review/Management: medications reviewed     Problem: Intestinal Obstruction  Goal: Optimal Bowel Function  Outcome: Progressing  Goal: Fluid and Electrolyte Balance  Outcome: Progressing  Goal: Absence  of Infection Signs and Symptoms  Outcome: Progressing  Goal: Optimize Nutrition Status  Outcome: Progressing  Goal: Optimal Pain Control and Function  Outcome: Progressing  Intervention: Prevent or Manage Pain  Recent Flowsheet Documentation  Taken 11/1/2024 0817 by Ambika Monroe LPN  Diversional Activities:   television   smartphone   Goal Outcome Evaluation:  Plan of Care Reviewed With: patient, spouse        Progress: improving  Outcome Evaluation: Patient is stable and no complaints of pain. tolerating clear liquids and good output in ostomy. Safety measures in place. Call light within reach. Patient able to make needs known. Plan of care ongoing.

## 2024-11-02 PROCEDURE — 96372 THER/PROPH/DIAG INJ SC/IM: CPT

## 2024-11-02 PROCEDURE — G0378 HOSPITAL OBSERVATION PER HR: HCPCS

## 2024-11-02 PROCEDURE — 25010000002 HEPARIN (PORCINE) PER 1000 UNITS: Performed by: STUDENT IN AN ORGANIZED HEALTH CARE EDUCATION/TRAINING PROGRAM

## 2024-11-02 RX ADMIN — Medication 10 ML: at 09:33

## 2024-11-02 RX ADMIN — PANTOPRAZOLE SODIUM 40 MG: 40 TABLET, DELAYED RELEASE ORAL at 09:33

## 2024-11-02 RX ADMIN — HEPARIN SODIUM 5000 UNITS: 5000 INJECTION INTRAVENOUS; SUBCUTANEOUS at 14:28

## 2024-11-02 RX ADMIN — Medication 10 ML: at 21:21

## 2024-11-02 RX ADMIN — HEPARIN SODIUM 5000 UNITS: 5000 INJECTION INTRAVENOUS; SUBCUTANEOUS at 05:30

## 2024-11-02 RX ADMIN — HEPARIN SODIUM 5000 UNITS: 5000 INJECTION INTRAVENOUS; SUBCUTANEOUS at 21:33

## 2024-11-02 RX ADMIN — SERTRALINE 50 MG: 50 TABLET, FILM COATED ORAL at 09:33

## 2024-11-02 RX ADMIN — SCOPOLAMINE 1 PATCH: 1.5 PATCH, EXTENDED RELEASE TRANSDERMAL at 14:46

## 2024-11-02 NOTE — PLAN OF CARE
Goal Outcome Evaluation:      Pt alert and oriented. Pt has no complaints of pain. Pt up ad nico. VS stable. Plan of care ongoing.

## 2024-11-02 NOTE — PROGRESS NOTES
Colorectal Surgery Progress Note    Pt. Name/Age/:  Mali Vann   64 y.o.    1960         Med. Record Number:   [id number]  Date of admission:  10/30/2024      Service(s): Colorectal Surgery      Subjective  Ostomy has been functioning   Denies nausea or vomiting   Tolerating liquid diet   Ambulating and voiding     Objective  Vitals:     Patient Vitals for the past 24 hrs:   BP Temp Temp src Pulse Resp SpO2   24 0511 121/72 98.1 °F (36.7 °C) Oral 72 14 98 %   24 2200 120/67 98.1 °F (36.7 °C) Oral 68 14 98 %   24 1125 107/57 98.2 °F (36.8 °C) Oral 74 16 94 %           Wt. Admission: Weight: 93 kg (205 lb 0.4 oz)     Wt. Current: Weight: 87.5 kg (192 lb 14.4 oz)   Body mass index is 27.68 kg/m².      I&O:  Intake/Output Summary (Last 24 hours) at 2024 0756  Last data filed at 2024 2200  Gross per 24 hour   Intake 240 ml   Output 225 ml   Net 15 ml     10/31 1901 -  0700  In: 480 [P.O.:480]  Out: 1675       Exam  General:  No acute distress, resting comfortably.  Cardiovascular: regular rate.  Respiratory: no increased work of breathing.  Abdomen:  Soft, appropriate incisional tenderness, non-distended. No diffuse guarding or rebound tenderness. Ostomy with bilious output   Extremities: warm, well-perfused extremities, no pitting edema.      Labs:     [unfilled]          Scheduled Meds:heparin (porcine), 5,000 Units, Subcutaneous, Q8H  meloxicam, 15 mg, Oral, Daily  pantoprazole, 40 mg, Oral, Daily  Scopolamine, 1 patch, Transdermal, Q72H  sertraline, 50 mg, Oral, Daily  sodium chloride, 10 mL, Intravenous, Q12H  sodium chloride, 10 mL, Intravenous, Q12H      Continuous Infusions:     PRN Meds:.  Calcium Replacement - Follow Nurse / BPA Driven Protocol    Magnesium Standard Dose Replacement - Follow Nurse / BPA Driven Protocol    methocarbamol    ondansetron ODT **OR** ondansetron    oxyCODONE    Phosphorus Replacement - Follow Nurse / BPA Driven Protocol    Potassium  Replacement - Follow Nurse / BPA Driven Protocol    prochlorperazine    [COMPLETED] Insert Peripheral IV **AND** sodium chloride    sodium chloride    sodium chloride    sodium chloride    sodium chloride    sodium chloride    sodium chloride          Assessment  64 y.o. female Status post total colectomy with end ileostomy presenting with postoperative ileus.     Plan / Recommendations    - advance to regular diet   - out of bed and ambulate   - ostomy and wound care   - potential discharge today vs tomorrow if she tolerates diet       07:56 EDT; 11/2/2024        Dickson Arizmendi MD  Colon and Rectal Surgery   [facility]

## 2024-11-03 ENCOUNTER — READMISSION MANAGEMENT (OUTPATIENT)
Dept: CALL CENTER | Facility: HOSPITAL | Age: 64
End: 2024-11-03
Payer: COMMERCIAL

## 2024-11-03 VITALS
OXYGEN SATURATION: 97 % | DIASTOLIC BLOOD PRESSURE: 60 MMHG | BODY MASS INDEX: 27.62 KG/M2 | HEART RATE: 66 BPM | HEIGHT: 70 IN | TEMPERATURE: 98.6 F | WEIGHT: 192.9 LBS | SYSTOLIC BLOOD PRESSURE: 93 MMHG | RESPIRATION RATE: 16 BRPM

## 2024-11-03 PROCEDURE — G0378 HOSPITAL OBSERVATION PER HR: HCPCS

## 2024-11-03 PROCEDURE — 96372 THER/PROPH/DIAG INJ SC/IM: CPT

## 2024-11-03 PROCEDURE — 25010000002 HEPARIN (PORCINE) PER 1000 UNITS: Performed by: STUDENT IN AN ORGANIZED HEALTH CARE EDUCATION/TRAINING PROGRAM

## 2024-11-03 RX ADMIN — Medication 10 ML: at 09:50

## 2024-11-03 RX ADMIN — PANTOPRAZOLE SODIUM 40 MG: 40 TABLET, DELAYED RELEASE ORAL at 09:00

## 2024-11-03 RX ADMIN — Medication 10 ML: at 09:49

## 2024-11-03 RX ADMIN — SERTRALINE 50 MG: 50 TABLET, FILM COATED ORAL at 09:00

## 2024-11-03 RX ADMIN — HEPARIN SODIUM 5000 UNITS: 5000 INJECTION INTRAVENOUS; SUBCUTANEOUS at 05:14

## 2024-11-03 NOTE — PLAN OF CARE
Goal Outcome Evaluation:      Pt alert and oriented. Pt has no complaints of pain at this time. Pt ambulating independently in hallway. VS stable. Plan of care ongoing.

## 2024-11-03 NOTE — PLAN OF CARE
Goal Outcome Evaluation:     Pt is able to make needs known. No c/o pain throughout shift. Pt is able to ambulate independently. Education is complete, call light is in reach, and no other needs at this time. Continue to monitor.

## 2024-11-03 NOTE — OUTREACH NOTE
Prep Survey      Flowsheet Row Responses   [facility] patient discharged from? [facility]   Is LACE score < 7 ? Yes   Eligibility [facility]   Date of Admission 10/30/24   Date of Discharge 11/03/24   Discharge Disposition Home-Health Care Svc   Discharge diagnosis Ileostomy in place   Does the patient have one of the following disease processes/diagnoses(primary or secondary)? Other   Does the patient have Home health ordered? Yes   What is the Home health agency?  [facility]   Is there a DME ordered? No   Prep survey completed? Yes            Estelita AMBRIZ - Registered Nurse

## 2024-11-03 NOTE — DISCHARGE INSTRUCTIONS
Post-Operative Discharge Instructions: Colon resection  [facility] [place]      Your successful surgery marks an important step toward your recovery. To ensure a smooth and griffin recovery process, please follow these post-operative discharge instructions:    1. Wound Care:    -Keep the incision areas clean and dry.  -Report any signs of infection such as increased redness, swelling, or discharge.  - Its ok to take a shower.     2. Pain Management:    -Take prescribed pain medications as directed.  - Take tylenol and motrin alternatively every three hours. You can take prescribed narcotics only if you have breakthrough pain.   - Do not drive if you have taken narcotic pain meds.       3. Activity:    - Begin with light activities like short walks around the house.  - Gradually increase activity levels as tolerated, but avoid strenuous activities for the first few weeks.  - Avoid heavy lifting (more than 10 pounds) until cleared by your surgeon.    4. Diet:    - Continue with regular diet. If you start feeling bloated or nauseas, back down to clear liquid diet and progress to a full liquid diet as tolerated.Gradually reintroduce solid foods.  - Stay well-hydrated and avoid carbonated beverages initially.    5. Medications:    -Resume regular medications as instructed by your healthcare provider.    6. Follow-up Appointments:    - Schedule and attend all follow-up appointments with your surgeon and healthcare team.  - Discuss any concerns or questions you may have about your recovery.    7. Signs of Complications:    Seek immediate medical attention if you experience:  -Persistent fever  -Increased abdominal pain  -Worsening redness, swelling, or discharge at the incision site  -Persistent nausea or vomiting  -Difficulty breathing or chest pain    8. Rest and Self-Care:    -Allow yourself time to rest and recover.  -Prioritize self-care and listen to your body.  -Reach out to friends, family, or support groups for  assistance and emotional support.    Remember, these instructions are general guidelines, and your surgeon may provide specific recommendations tailored to your case. If you have any questions or concerns during your recovery, don't hesitate to contact your healthcare provider.    Wishing you a smooth and speedy recovery!  Dickson Arizmendi MD   Colon and Rectal Surgery   [facility] [place] [address]  T: [phone]

## 2024-11-03 NOTE — DISCHARGE SUMMARY
Date of Admission: 10/30/2024  Date of Discharge:  11/3/2024  Primary Care Physician: Hui Noe APRN     Discharge Diagnosis:  Active Hospital Problems    Diagnosis  POA    **Ileostomy in place [Z93.2]  Not Applicable    Ileus [K56.7]  Yes      Resolved Hospital Problems   No resolved problems to display.       Presenting Problem/History of Present Illness:  Postoperative ileus [K91.89, K56.7]  Ileostomy in place [Z93.2]  TWILA (acute kidney injury) [N17.9]  Ileus [K56.7]     Hospital Course:  The patient is a 64 y.o. female who presented with low ileostomy output with nausea and vomiting.    Exam Today: Soft, nontender, nondistended, productive and functioning ileostomy.    Procedures Performed:         Consults:   Consults       Date and Time Order Name Status Description    10/30/2024 11:42 AM Surgery (on-call MD unless specified)               Discharge Disposition:  Home or Self Care    Discharge Medications:     Discharge Medications        Continue These Medications        Instructions Start Date   loperamide 2 MG tablet  Commonly known as: Imodium A-D   2 mg, Oral, 4 Times Daily PRN      ondansetron 4 MG tablet  Commonly known as: Zofran   4 mg, Oral, Daily PRN      oxyCODONE 5 MG immediate release tablet  Commonly known as: Roxicodone   5 mg, Oral, Every 8 Hours PRN      pantoprazole 40 MG EC tablet  Commonly known as: Protonix   40 mg, Oral, Daily      sertraline 50 MG tablet  Commonly known as: ZOLOFT   50 mg, Oral, Daily               Discharge Diet:   Diet Instructions    Regular           Activity at Discharge:   Activity Instructions    As Tolerated           Follow-up Appointments:  Future Appointments   Date Time Provider Department Center   11/6/2024 11:15 AM Hui Noe APRN [facility]         Test Results Pending at Discharge: None     Dickson Arizmendi MD  11/03/24  14:10 EST    Time Spent on Discharge Activities: 10 minutes

## 2024-11-04 ENCOUNTER — TRANSITIONAL CARE MANAGEMENT TELEPHONE ENCOUNTER (OUTPATIENT)
Dept: CALL CENTER | Facility: HOSPITAL | Age: 64
End: 2024-11-04
Payer: COMMERCIAL

## 2024-11-04 NOTE — OUTREACH NOTE
Call Center TCM Note      Flowsheet Row Responses   [facility] [place] [facility] discharged from? [facility]   Does the patient have one of the following disease processes/diagnoses(primary or secondary)? Other   TCM attempt successful? Yes   Call start time 1615   Call end time 1617   Discharge diagnosis Ileostomy in place   Meds reviewed with patient/caregiver? Yes   Does the patient have all medications ordered at discharge? N/A   Is the patient taking all medications as directed (includes completed medication regime)? Yes   Comments HOSP DC FU appt 11/6/24 1115 am   Does the patient have an appointment with their PCP within 7-14 days of discharge? Yes   Has home health visited the patient within 72 hours of discharge? N/A   Psychosocial issues? No   Did the patient receive a copy of their discharge instructions? Yes   Nursing interventions Reviewed instructions with patient   What is the patient's perception of their health status since discharge? Improving   Is the patient/caregiver able to teach back signs and symptoms related to disease process for when to call PCP? Yes   Is the patient/caregiver able to teach back signs and symptoms related to disease process for when to call 911? Yes   Is the patient/caregiver able to teach back the hierarchy of who to call/visit for symptoms/problems? PCP, Specialist, Home health nurse, Urgent Care, ED, 911 Yes   TCM call completed? Yes   Wrap up additional comments Pt reports she is doing ok. She will call for post op appt to have staples removed.   Call end time 1617            Mindy Abdi RN    11/4/2024, 16:18 EST

## 2024-11-04 NOTE — CASE MANAGEMENT/SOCIAL WORK
Case Management Discharge Note      Final Note: Routine home         Selected Continued Care - Discharged on 11/3/2024 Admission date: 10/30/2024 - Discharge disposition: Home or Self Care       Transportation Services  Private: Car    Final Discharge Disposition Code: 01 - home or self-care

## 2024-11-04 NOTE — OUTREACH NOTE
Call Center TCM Note      Flowsheet Row Responses   [facility] facility patient discharged from? [facility]   Does the patient have one of the following disease processes/diagnoses(primary or secondary)? Other   TCM attempt successful? No   Unsuccessful attempts Attempt 1            Mindy Abdi RN    11/4/2024, 14:55 EST

## 2024-11-06 ENCOUNTER — OFFICE VISIT (OUTPATIENT)
Dept: FAMILY MEDICINE CLINIC | Facility: CLINIC | Age: 64
End: 2024-11-06
Payer: COMMERCIAL

## 2024-11-06 VITALS
OXYGEN SATURATION: 97 % | BODY MASS INDEX: 26.94 KG/M2 | SYSTOLIC BLOOD PRESSURE: 106 MMHG | HEART RATE: 68 BPM | TEMPERATURE: 97.2 F | HEIGHT: 70 IN | RESPIRATION RATE: 18 BRPM | WEIGHT: 188.2 LBS | DIASTOLIC BLOOD PRESSURE: 67 MMHG

## 2024-11-06 DIAGNOSIS — E66.3 OVERWEIGHT (BMI 25.0-29.9): ICD-10-CM

## 2024-11-06 DIAGNOSIS — F33.42 RECURRENT MAJOR DEPRESSIVE DISORDER, IN FULL REMISSION: ICD-10-CM

## 2024-11-06 DIAGNOSIS — R73.03 PREDIABETES: ICD-10-CM

## 2024-11-06 DIAGNOSIS — R74.8 ELEVATED LIVER ENZYMES: ICD-10-CM

## 2024-11-06 DIAGNOSIS — H93.91 LESION OF RIGHT EAR: ICD-10-CM

## 2024-11-06 DIAGNOSIS — Z93.2 ILEOSTOMY IN PLACE: ICD-10-CM

## 2024-11-06 DIAGNOSIS — Z00.00 WELLNESS EXAMINATION: Primary | ICD-10-CM

## 2024-11-06 DIAGNOSIS — D72.829 LEUKOCYTOSIS, UNSPECIFIED TYPE: ICD-10-CM

## 2024-11-06 DIAGNOSIS — E78.5 DYSLIPIDEMIA: ICD-10-CM

## 2024-11-06 DIAGNOSIS — Z28.21 INFLUENZA VACCINATION DECLINED: ICD-10-CM

## 2024-11-06 DIAGNOSIS — Z85.038 HISTORY OF COLON CANCER: ICD-10-CM

## 2024-11-06 DIAGNOSIS — Z53.20 OSTEOPOROSIS SCREENING DECLINED: ICD-10-CM

## 2024-11-06 DIAGNOSIS — D64.9 ANEMIA, UNSPECIFIED TYPE: ICD-10-CM

## 2024-11-06 DIAGNOSIS — R53.83 FATIGUE, UNSPECIFIED TYPE: ICD-10-CM

## 2024-11-06 DIAGNOSIS — Z28.21 TETANUS, DIPHTHERIA, AND ACELLULAR PERTUSSIS (TDAP) VACCINATION DECLINED: ICD-10-CM

## 2024-11-06 DIAGNOSIS — B00.1 COLD SORE: ICD-10-CM

## 2024-11-06 DIAGNOSIS — R91.1 LUNG NODULE: ICD-10-CM

## 2024-11-06 DIAGNOSIS — C18.2 MALIGNANT NEOPLASM OF ASCENDING COLON: ICD-10-CM

## 2024-11-06 DIAGNOSIS — Z09 HOSPITAL DISCHARGE FOLLOW-UP: ICD-10-CM

## 2024-11-06 LAB
BILIRUB BLD-MCNC: NEGATIVE MG/DL
CLARITY, POC: ABNORMAL
COLOR UR: ABNORMAL
EXPIRATION DATE: ABNORMAL
GLUCOSE UR STRIP-MCNC: NEGATIVE MG/DL
KETONES UR QL: NEGATIVE
LEUKOCYTE EST, POC: ABNORMAL
Lab: ABNORMAL
NITRITE UR-MCNC: NEGATIVE MG/ML
PH UR: 6 [PH] (ref 5–8)
PROT UR STRIP-MCNC: ABNORMAL MG/DL
RBC # UR STRIP: ABNORMAL /UL
SP GR UR: 1.02 (ref 1–1.03)
UROBILINOGEN UR QL: ABNORMAL

## 2024-11-06 RX ORDER — VALACYCLOVIR HYDROCHLORIDE 1 G/1
2000 TABLET, FILM COATED ORAL EVERY 12 HOURS
Qty: 4 TABLET | Refills: 0 | Status: SHIPPED | OUTPATIENT
Start: 2024-11-06 | End: 2024-11-07

## 2024-11-06 RX ORDER — METHOCARBAMOL 750 MG/1
750 TABLET, FILM COATED ORAL 4 TIMES DAILY PRN
COMMUNITY

## 2024-11-06 NOTE — PROGRESS NOTES
Chief Complaint  Annual Exam, Hospital Follow Up Visit (Follow up from admission on 10/30/2024), Prediabetes, Hyperlipidemia, and Depression    Subjective          Mali is a 64 y.o. female  who presents to [facility]     Patient Care Team:  Hui Noe APRN as PCP - General (Family Medicine)  Dickson Arizmendi MD as Consulting Physician (Colon and Rectal Surgery)     History of Present Illness  Adult Annual Wellness    Social History    Tobacco Use      Smoking status: Former        Packs/day: 0.00        Years: 1 pack/day for 4.0 years (4.0 ttl pk-yrs)        Types: Cigarettes        Start date: 1977        Quit date: 1981        Years since quittin.0      Smokeless tobacco: Never    Vaping Use      Vaping status: Never Used    Alcohol use: Not Currently    Drug use: Not Currently     Caffeine use - no    General health habits  Last eye exam - about 2 years ago, wears glasses for reading  Last dental exam - she has an appointment scheduled    Diet - Regular diet    Weight / BMI - overweight, BMI 27    Exercise - walking    Hours of sleep per night ? 6    Safety -   Do you use seat belts consistently ? Yes   Working smoke detector in home ?  Yes   Do you use sunscreen when outdoors ? Yes     Reproductive health -  Sexually active ? Yes   Birth control - menopause  History of abnormal pap smear ? No     Screening/prevention  Last mammogram/ breast cancer screening - 3/7/2024  Last pap smear/ cervical cancer screening - unsure  Last DEXA scan - never  Colon cancer screening - colonoscopy 2024    Immunizations -   Tdap - declined  Pneumococcal vaccine - declined  Shingles (Shingrix) - no    ++++++++++++++++++++++++    Mali is here for hospital follow up    She was admitted to [facility] on 10/22/2024 for surgery for colon cancer.  She underwent a LAPAROSCOPIC TOTAL COLECTOMY WITH DAVINCI ROBOT AND ILEOSTOMY, CYSTOSCOPY on 10/22/2024  She was seen  by oncology, Dr. Jameson, on 10/25/2024.  It was recommended she follow up with oncology as an outpatient for genetic testing.  She was discharged home on 10/26/2024    She returned to the emergency room at [facility] on 10/30/2024 for decreased colostomy output, nausea and vomiting.  She was admitted for fluid resuscitation  Dx post op ileus  Discharged on 11/3/2024     ++++++++++++++++++++++++    Cold sore on upper lip  Sore on right ear since surgery.  She states it is getting better.    ++++++++++++++++++++++++    Patient presents for follow-up of prediabetes  This is an established problem  Interim treatment changes: none  Current medications: none  Hgba1c 5.9 % on 2/21/2024    ++++++++++++++++++++++++    Patient presents for follow-up of dyslipidemia  This is an established problem  Interim treatment changes: none.  She did not want to start a statin  Current medications: none  Last lipid panel 2/21/2024    ++++++++++++++++++++++++     Patient presents for follow-up of depression  This is an established problem.    Interim treatment changes: none  Current medications: sertraline 50 mg daily  PHQ-2 = 0    Mali Vann  has a past medical history of Colon cancer, Depression, Dyslipidemia, History of colon cancer, Lung nodule, and Prediabetes (02/21/2024).      Review of Systems   Constitutional:  Positive for fatigue. Negative for fever.   HENT:  Negative for ear pain and sore throat.    Eyes:  Negative for visual disturbance.   Respiratory:  Negative for cough and shortness of breath.    Cardiovascular:  Negative for chest pain, palpitations and leg swelling.   Gastrointestinal:  Negative for nausea and vomiting.        No heartburn   Genitourinary:  Negative for dysuria, frequency, hematuria and urgency.   Skin:  Negative for rash.   Allergic/Immunologic: Negative for environmental allergies.   Neurological:  Negative for dizziness and headaches.   Psychiatric/Behavioral:  Positive for sleep disturbance.          Family History   Problem Relation Age of Onset    Hypertension Mother     Diabetes Father     Lung cancer Father         Associated to tobacco smoking.    Cancer Sister         The source of the malignancy was not clear but was metastatic.    Kidney cancer Maternal Grandmother     Lymphoma Maternal Grandfather         Past Surgical History:   Procedure Laterality Date    2021    in [place]    COLON RESECTION N/A 10/22/2024    Procedure: LAPAROSCOPIC TOTAL COLECTOMY WITH DAVINCI ROBOT AND ILEOSTOMY;  Surgeon: Dickson Arizmendi MD;  Location: [facility] MAIN OR;  Service: Robotics - DaVinci;  Laterality: N/A;    COLON SURGERY      12-18 inches colon removed due to cancer; In [place], [place]    COLONOSCOPY      in [place], [place] Dr. Beck    COLONOSCOPY N/A 2024    Procedure: COLONOSCOPY with biopsy of cecal mass x3 areas,cold snare polypectomy x8.hot snare polypectomy x2 and tatooing x2 areas;  Surgeon: Dickson Arizmendi MD;  Location: [facility] ENDOSCOPY;  Service: General;  Laterality: N/A;  cecal mass,colon polyps,colon mass    CYSTOSCOPY N/A 10/22/2024    Procedure: CYSTOSCOPY;  Surgeon: Dickson Arizmendi MD;  Location: [facility] MAIN OR;  Service: Robotics - DaVinci;  Laterality: N/A;        Social History     Socioeconomic History    Marital status:      Spouse name: Dilip    Number of children: 4    Highest education level: High school graduate   Tobacco Use    Smoking status: Former     Current packs/day: 0.00     Average packs/day: 1 pack/day for 4.0 years (4.0 ttl pk-yrs)     Types: Cigarettes     Start date: 1977     Quit date: 1981     Years since quittin.0    Smokeless tobacco: Never   Vaping Use    Vaping status: Never Used   Substance and Sexual Activity    Alcohol use: Not Currently    Drug use: Not Currently     Comment: I dont remember    Sexual activity: Yes     Partners: Male     Birth control/protection: Post-menopausal          There is no immunization history on  "file for this patient.    Menstrual History:  OB History          4    Para   4    Term   4            AB   0    Living   4         SAB        IAB        Ectopic        Molar        Multiple        Live Births   4          Obstetric Comments   Menarche - unsure age  LMP - in 30's               No LMP recorded. Patient is postmenopausal.        Objective       Current Outpatient Medications:     loperamide (Imodium A-D) 2 MG tablet, Take 1 tablet by mouth 4 (Four) Times a Day As Needed for Diarrhea for up to 160 days., Disp: 80 tablet, Rfl: 1    methocarbamol (ROBAXIN) 750 MG tablet, Take 1 tablet by mouth 4 (Four) Times a Day As Needed for Muscle Spasms., Disp: , Rfl:     ondansetron (Zofran) 4 MG tablet, Take 1 tablet by mouth Daily As Needed for Nausea or Vomiting., Disp: 30 tablet, Rfl: 1    oxyCODONE (Roxicodone) 5 MG immediate release tablet, Take 1 tablet by mouth Every 8 (Eight) Hours As Needed for Moderate Pain for up to 40 days., Disp: 40 tablet, Rfl: 0    pantoprazole (Protonix) 40 MG EC tablet, Take 1 tablet by mouth Daily for 40 days., Disp: 40 tablet, Rfl: 0    sertraline (ZOLOFT) 50 MG tablet, Take 1 tablet by mouth once daily, Disp: 90 tablet, Rfl: 0    Vital Signs:      /67   Pulse 68   Temp 97.2 °F (36.2 °C) (Temporal)   Resp 18   Ht 177.8 cm (70\")   Wt 85.4 kg (188 lb 3.2 oz)   SpO2 97%   BMI 27.00 kg/m²     Vitals:    24 1135   BP: 106/67   Pulse: 68   Resp: 18   Temp: 97.2 °F (36.2 °C)   TempSrc: Temporal   SpO2: 97%   Weight: 85.4 kg (188 lb 3.2 oz)   Height: 177.8 cm (70\")      Physical Exam  Vitals reviewed. Exam conducted with a chaperone present (accompanied by ).   Constitutional:       General: She is not in acute distress.     Appearance: Normal appearance.   HENT:      Head: Normocephalic and atraumatic.      Right Ear: Tympanic membrane and ear canal normal.      Left Ear: Tympanic membrane, ear canal and external ear normal.      Ears:        " Mouth/Throat:      Lips: Lesions (cold sore upper lip) present.      Mouth: Mucous membranes are moist.      Pharynx: Oropharynx is clear. No oropharyngeal exudate.   Eyes:      General: No scleral icterus.     Conjunctiva/sclera: Conjunctivae normal.   Neck:      Thyroid: No thyromegaly.   Cardiovascular:      Rate and Rhythm: Normal rate and regular rhythm.      Heart sounds: Normal heart sounds.   Pulmonary:      Effort: Pulmonary effort is normal. No respiratory distress.      Breath sounds: Normal breath sounds. No wheezing.   Abdominal:      General: There is no distension.      Palpations: Abdomen is soft. There is no mass.      Tenderness: There is no abdominal tenderness. There is no guarding or rebound.      Comments: Ileostomy in place  Staples intact from surgery   Musculoskeletal:      Cervical back: Neck supple.      Right lower leg: No edema.      Left lower leg: No edema.   Lymphadenopathy:      Cervical: No cervical adenopathy.   Skin:     General: Skin is warm and dry.   Neurological:      Mental Status: She is alert and oriented to person, place, and time.   Psychiatric:         Mood and Affect: Mood normal.         Behavior: Behavior normal.              Result Review :   The following data was reviewed by: RASTA Escalona on 11/06/2024:  Common labs          10/30/2024    10:05 10/30/2024    14:45 10/31/2024    01:14 11/1/2024    01:46   Common Labs   Glucose 138  112  148  146    BUN 12  12  12  10    Creatinine 1.06  1.00  0.69  0.82    Sodium 130  133  133  135    Potassium 3.8  3.9  4.0  4.1    Chloride 90  94  96  99    Calcium 9.4  8.6  8.6  8.4    Albumin 3.6    3.4    Total Bilirubin 0.8    0.5    Alkaline Phosphatase 206    144    AST (SGOT) 54    39    ALT (SGPT) 97    57    WBC 14.92  11.90  13.14  12.79    Hemoglobin 10.9  10.0  9.4  9.5    Hematocrit 33.0  30.9  28.5  29.0    Platelets 484  423  395  433      Office Visit on 11/06/2024   Component Date Value Ref Range Status     Color 11/06/2024 Orange (A)  Yellow, Straw, Dark Yellow, Charity Final    Clarity, UA 11/06/2024 Slightly Cloudy (A)  Clear Final    Specific Gravity  11/06/2024 1.020  1.005 - 1.030 Final    pH, Urine 11/06/2024 6.0  5.0 - 8.0 Final    Leukocytes 11/06/2024 Trace (A)  Negative Final    Nitrite, UA 11/06/2024 Negative  Negative Final    Protein, POC 11/06/2024 Trace (A)  Negative mg/dL Final    Glucose, UA 11/06/2024 Negative  Negative mg/dL Final    Ketones, UA 11/06/2024 Negative  Negative Final    Urobilinogen, UA 11/06/2024 0.2 E.U./dL  Normal, 0.2 E.U./dL Final    Bilirubin 11/06/2024 Negative  Negative Final    Blood, UA 11/06/2024 Trace (A)  Negative Final    Lot Number 11/06/2024 [id number]   Final    Expiration Date 11/06/2024 05/31/2025   Final    Urine Culture 11/06/2024 Final report   Final    Result 1 11/06/2024 Comment   Final    Comment: Mixed urogenital hayder  25,000-50,000 colony forming units per mL         PHQ-2 Depression Screening  Little interest or pleasure in doing things? Not at all   Feeling down, depressed, or hopeless? Not at all   PHQ-2 Total Score 0               Assessment and Plan    Diagnoses and all orders for this visit:    1. Wellness examination (Primary)  Assessment & Plan:  Discussed preventative healthcare.  Labs ordered. Recommended annual eye and dental exams. Recommended use of seatbelts, sunscreen and smoke detectors in the home.  Tdap declined. Follow up for well woman/pap smear. Breast cancer screening (mammogram) up to date.       2. Hospital discharge follow-up  Comments:  Admission x 2 at [facility]    3. Cold sore  Comments:  Begin valtrex  Orders:  -     valACYclovir (Valtrex) 1000 MG tablet; Take 2 tablets by mouth Every 12 (Twelve) Hours for 1 day.  Dispense: 4 tablet; Refill: 0    4. Lesion of right ear  Comments:  Reports she has had since surgery and it is improving.  Continue to monitor and she is to follow up if lesion does not resolve.    5.  Prediabetes  Overview:  Hgba1c 5.9 % on 2/21/2024    Assessment & Plan:  Established problem  Continue lifestyle changes: low carbohydrate, no concentrated sweets, exercise and weight reduction.  Recheck hgba1c    Orders:  -     Comprehensive Metabolic Panel  -     Hemoglobin A1c    6. Dyslipidemia  Assessment & Plan:  Established problem  Continue lifestyle changes: low fat, low cholesterol diet, exercise and weight loss.  Recheck lipid panel.    Orders:  -     Lipid Panel    7. Malignant neoplasm of ascending colon  Assessment & Plan:  S/p total colectomy due to colon cancer 10/22/2024    Follow up with colorectal surgeon, Dr. Arizmendi, as scheduled.    She was seen by oncology in the hospital and it was recommended she follow up as outpatient for genetic testing.  She has decided she does not want genetic testing and has not scheduled a follow up appointment.      8. Ileostomy in place    9. History of colon cancer    10. Recurrent major depressive disorder, in full remission  Assessment & Plan:  Chronic stable problem.  Continue sertraline 50 mg daily.      11. Lung nodule  Overview:  CT chest on 9/20/2024 = 5 mm nodule in the left upper lung     Assessment & Plan:  Will need continued surveillance and follow-up      12. Elevated liver enzymes  Comments:  Recheck liver enzymes    13. Leukocytosis, unspecified type  Comments:  Recheck CBC  Orders:  -     CBC & Differential  -     POCT urinalysis dipstick, automated  -     Urine Culture - Urine, Urine, Random Void    14. Anemia, unspecified type  Comments:  Recheck CBC    15. Fatigue, unspecified type  Comments:  Check TSH  Orders:  -     TSH Rfx On Abnormal To Free T4    16. Overweight (BMI 25.0-29.9)  Assessment & Plan:  Patient's (Body mass index is 27 kg/m².) indicates that they are overweight with health conditions that include dyslipidemias and prediabetes  . Weight is  decreased . BMI is above average; BMI management plan is completed. We discussed portion  control and increasing exercise.       17. Influenza vaccination declined  Comments:  Discussed flu vaccine and she declined.    18. Tetanus, diphtheria, and acellular pertussis (Tdap) vaccination declined  Comments:  Discussed Tdap and she declined    19. Osteoporosis screening declined  Comments:  Declined DEXA scan       Follow up with Dr. Arizmendi as scheduled on 11/18/2024      Follow Up   Return in about 6 months (around 5/6/2025) for chronic conditions.  Patient was given instructions and counseling regarding her condition or for health maintenance advice. Please see specific information pulled into the AVS if appropriate.    Patient Instructions   Go to [facility] (across the [place] from office- [address]) for bloodwork.

## 2024-11-08 LAB
BACTERIA UR CULT: NORMAL
BACTERIA UR CULT: NORMAL

## 2024-11-09 PROBLEM — R91.1 LUNG NODULE: Status: ACTIVE | Noted: 2024-11-09

## 2024-11-10 NOTE — ASSESSMENT & PLAN NOTE
Patient's (Body mass index is 27 kg/m².) indicates that they are overweight with health conditions that include dyslipidemias and prediabetes  . Weight is  decreased . BMI is above average; BMI management plan is completed. We discussed portion control and increasing exercise.

## 2024-11-10 NOTE — ASSESSMENT & PLAN NOTE
Established problem  Continue lifestyle changes: low fat, low cholesterol diet, exercise and weight loss.  Recheck lipid panel.

## 2024-11-10 NOTE — ASSESSMENT & PLAN NOTE
S/p total colectomy due to colon cancer 10/22/2024    Follow up with colorectal surgeon, Dr. Arizmendi, as scheduled.    She was seen by oncology in the hospital and it was recommended she follow up as outpatient for genetic testing.  She has decided she does not want genetic testing and has not scheduled a follow up appointment.

## 2024-11-10 NOTE — ASSESSMENT & PLAN NOTE
Established problem  Continue lifestyle changes: low carbohydrate, no concentrated sweets, exercise and weight reduction.  Recheck hgba1c

## 2024-11-10 NOTE — ASSESSMENT & PLAN NOTE
Discussed preventative healthcare.  Labs ordered. Recommended annual eye and dental exams. Recommended use of seatbelts, sunscreen and smoke detectors in the home.  Tdap declined. Follow up for well woman/pap smear. Breast cancer screening (mammogram) up to date.

## 2024-11-13 DIAGNOSIS — F33.42 RECURRENT MAJOR DEPRESSIVE DISORDER, IN FULL REMISSION: ICD-10-CM

## 2024-11-18 ENCOUNTER — OFFICE VISIT (OUTPATIENT)
Age: 64
End: 2024-11-18
Payer: COMMERCIAL

## 2024-11-18 VITALS
DIASTOLIC BLOOD PRESSURE: 76 MMHG | TEMPERATURE: 98.6 F | OXYGEN SATURATION: 98 % | BODY MASS INDEX: 27.49 KG/M2 | WEIGHT: 192 LBS | HEART RATE: 85 BPM | SYSTOLIC BLOOD PRESSURE: 119 MMHG | HEIGHT: 70 IN

## 2024-11-18 DIAGNOSIS — C18.2 MALIGNANT NEOPLASM OF ASCENDING COLON: Primary | ICD-10-CM

## 2024-11-18 PROCEDURE — 99024 POSTOP FOLLOW-UP VISIT: CPT | Performed by: STUDENT IN AN ORGANIZED HEALTH CARE EDUCATION/TRAINING PROGRAM

## 2024-11-19 NOTE — PROGRESS NOTES
Colorectal Surgery Followup Note    ID:  Mali Vann;   : 1960  DATE OF VISIT: 2024    Chief Complaint  Post-op (PO TOTAL COLECTOMY 10/22)       Subjective    Ms. Vann is status post total abdominal colectomy with end ileostomy.  Since her recent discharge from the hospital, she has been doing great.  She reports no nausea or vomiting.  She reports good appetite.  She reports improving energy levels.  She reports couple episodes of leakage from the ostomy bag.  Exam  General:  No acute distress  Head: Normocephalic, atraumatic  Neuro: Alert and oriented    Abdomen:  Soft, non-tender, non-distended, no hernias, no hepatomegaly, no splenomegaly. No abnormal, audible bowel sounds.  All incisions well-healed and intact.  All staples have been removed.    Assessment  -Recurrent colon cancer status post total abdominal colectomy with end ileostomy    Plan / Recommendations  -Ms. Vann is recovering well.  I have given her a few supplies for ostomy care  -Discussed importance of hydration.  We discussed how to manage high ileostomy output with fiber supplements as well as Imodium  -We discussed further treatment including chemotherapy however given the findings of the final pathology, we will hold off  -I have recommended for genetic testing and counseling given family history of cancer and personal history of recurrent cancer  -Follow-up in 4 weeks      Dickson Arizmendi MD  Colon and Rectal Surgery   [facility]

## 2025-01-02 ENCOUNTER — TELEPHONE (OUTPATIENT)
Dept: FAMILY MEDICINE CLINIC | Facility: CLINIC | Age: 65
End: 2025-01-02
Payer: COMMERCIAL

## 2025-01-02 NOTE — TELEPHONE ENCOUNTER
Provider: FAHAD LACKEY    Caller: Mali Vann    Relationship to Patient: Self    Pharmacy: [facility] [place] - [id number] - [phone] [facility] [phone]  [phone]     Phone Number:   [phone]     Reason for Call: PATIENT IS CALLING TO STATE SHE SAW HER PCP A FEW MONTHS BACK AND WAS TOLD SHE HAS FLUID IN HER EARS.  SHE STATES HER LEFT EAR HAS GOTTEN WORSE AND SHE IS HAVING A PROBLEM HEARING.  SHE IS WANTING TO KNOW IF MS LACKEY WOULD BE WILLING TO PRESCRIBE A MEDICATION TO HELP.    PLEASE ADVISE.

## 2025-01-03 ENCOUNTER — OFFICE VISIT (OUTPATIENT)
Dept: FAMILY MEDICINE CLINIC | Facility: CLINIC | Age: 65
End: 2025-01-03
Payer: COMMERCIAL

## 2025-01-03 VITALS
HEART RATE: 58 BPM | BODY MASS INDEX: 27.86 KG/M2 | TEMPERATURE: 98 F | OXYGEN SATURATION: 97 % | HEIGHT: 70 IN | DIASTOLIC BLOOD PRESSURE: 76 MMHG | WEIGHT: 194.6 LBS | SYSTOLIC BLOOD PRESSURE: 124 MMHG | RESPIRATION RATE: 20 BRPM

## 2025-01-03 DIAGNOSIS — H61.22 IMPACTED CERUMEN OF LEFT EAR: ICD-10-CM

## 2025-01-03 DIAGNOSIS — H69.93 ETD (EUSTACHIAN TUBE DYSFUNCTION), BILATERAL: Primary | ICD-10-CM

## 2025-01-03 DIAGNOSIS — H91.91 DECREASED HEARING OF RIGHT EAR: ICD-10-CM

## 2025-01-03 PROCEDURE — 99213 OFFICE O/P EST LOW 20 MIN: CPT | Performed by: NURSE PRACTITIONER

## 2025-01-03 RX ORDER — FLUTICASONE PROPIONATE 50 MCG
SPRAY, SUSPENSION (ML) NASAL
Qty: 16 G | Refills: 5 | Status: SHIPPED | OUTPATIENT
Start: 2025-01-03

## 2025-01-03 RX ORDER — CETIRIZINE HYDROCHLORIDE 10 MG/1
10 TABLET ORAL NIGHTLY PRN
Qty: 30 TABLET | Refills: 12 | Status: SHIPPED | OUTPATIENT
Start: 2025-01-03

## 2025-01-03 NOTE — PROGRESS NOTES
Chief Complaint  Ear Fullness (Right ear)    Subjective          Mali is a 64 y.o. female  who presents to [facility]     Patient Care Team:  uHi Noe APRN as PCP - General (Family Medicine)  Dickson Arizmendi MD as Consulting Physician (Colon and Rectal Surgery)     History of Present Illness  Mali is here today for decreased hearing in right ear and sinus problems since surgery on 10/22/2024    Location: decreased hearing in right ear and sinus problems    Quality: clear nasal drainage  Severity: mild to moderate  Duration: for about 2 months  Timing: constant; not getting better or worse  Context: Colon resection surgery 10/22/2024  Alleviating factors: Tylenol has helped   Aggravating factors: nothing makes worse  Associated Symptoms: no ear drainage, clear nasal drainage      Mali Vann  has a past medical history of Colon cancer, Depression, Dyslipidemia, History of colon cancer, Lung nodule, and Prediabetes (02/21/2024).      Review of Systems   Constitutional:  Negative for fever.   HENT:  Positive for rhinorrhea. Negative for congestion, ear discharge, ear pain, sinus pressure, sinus pain and tinnitus.    Neurological:  Positive for headaches. Negative for dizziness.        Family History   Problem Relation Age of Onset    Hypertension Mother     Diabetes Father     Lung cancer Father         Associated to tobacco smoking.    Cancer Sister         The source of the malignancy was not clear but was metastatic.    Kidney cancer Maternal Grandmother     Lymphoma Maternal Grandfather         Past Surgical History:   Procedure Laterality Date    CHOLECYSTECTOMY  2021    in [place]    COLON RESECTION N/A 10/22/2024    Procedure: LAPAROSCOPIC TOTAL COLECTOMY WITH DAVINCI ROBOT AND ILEOSTOMY;  Surgeon: Dickson Arizmendi MD;  Location: [facility];  Service: Robotics - DaVinci;  Laterality: N/A;    COLON SURGERY  1990    12-18 inches colon removed due to cancer; In  [place], [place]    COLONOSCOPY      in [place], [place] Dr. Beck    COLONOSCOPY N/A 2024    Procedure: COLONOSCOPY with biopsy of cecal mass x3 areas,cold snare polypectomy x8.hot snare polypectomy x2 and tatooing x2 areas;  Surgeon: Dickson Arizmendi MD;  Location: [facility] ENDOSCOPY;  Service: General;  Laterality: N/A;  cecal mass,colon polyps,colon mass    CYSTOSCOPY N/A 10/22/2024    Procedure: CYSTOSCOPY;  Surgeon: Dickson Arizmendi MD;  Location: [facility] MAIN OR;  Service: Robotics - DaVinci;  Laterality: N/A;        Social History     Socioeconomic History    Marital status:      Spouse name: Dilip    Number of children: 4    Highest education level: High school graduate   Tobacco Use    Smoking status: Former     Current packs/day: 0.00     Average packs/day: 1 pack/day for 4.0 years (4.0 ttl pk-yrs)     Types: Cigarettes     Start date: 1977     Quit date: 1981     Years since quittin.1    Smokeless tobacco: Never   Vaping Use    Vaping status: Never Used   Substance and Sexual Activity    Alcohol use: Not Currently    Drug use: Not Currently     Comment: I dont remember    Sexual activity: Yes     Partners: Male     Birth control/protection: Post-menopausal          There is no immunization history on file for this patient.    Objective       Current Outpatient Medications:     ondansetron (Zofran) 4 MG tablet, Take 1 tablet by mouth Daily As Needed for Nausea or Vomiting., Disp: 30 tablet, Rfl: 1    sertraline (ZOLOFT) 50 MG tablet, Take 1 tablet by mouth once daily, Disp: 90 tablet, Rfl: 1    cetirizine (zyrTEC) 10 MG tablet, Take 1 tablet by mouth At Night As Needed for Allergies., Disp: 30 tablet, Rfl: 12    fluticasone (FLONASE) 50 MCG/ACT nasal spray, 1-2 sprays in each nostril once daily for allergies, Disp: 16 g, Rfl: 5    loperamide (Imodium A-D) 2 MG tablet, Take 1 tablet by mouth 4 (Four) Times a Day As Needed for Diarrhea for up to 160 days. (Patient not taking: Reported on  "1/3/2025), Disp: 80 tablet, Rfl: 1    methocarbamol (ROBAXIN) 750 MG tablet, Take 1 tablet by mouth 4 (Four) Times a Day As Needed for Muscle Spasms. (Patient not taking: Reported on 1/3/2025), Disp: , Rfl:     Vital Signs:      /76   Pulse 58   Temp 98 °F (36.7 °C) (Temporal)   Resp 20   Ht 177.8 cm (70\")   Wt 88.3 kg (194 lb 9.6 oz)   SpO2 97%   BMI 27.92 kg/m²     Vitals:    01/03/25 1428   BP: 124/76   Pulse: 58   Resp: 20   Temp: 98 °F (36.7 °C)   TempSrc: Temporal   SpO2: 97%   Weight: 88.3 kg (194 lb 9.6 oz)   Height: 177.8 cm (70\")      Physical Exam  Vitals reviewed.   Constitutional:       General: She is not in acute distress.     Appearance: Normal appearance.   HENT:      Head: Normocephalic and atraumatic.      Right Ear: Ear canal and external ear normal. A middle ear effusion is present. Tympanic membrane is retracted.      Left Ear: Ear canal and external ear normal. A middle ear effusion is present. There is impacted cerumen (removed with lavage). Tympanic membrane is retracted.      Nose: Nose normal. Congestion and rhinorrhea present.      Mouth/Throat:      Mouth: Mucous membranes are moist.      Pharynx: Oropharynx is clear.   Eyes:      General: No scleral icterus.     Conjunctiva/sclera: Conjunctivae normal.   Cardiovascular:      Rate and Rhythm: Normal rate and regular rhythm.      Heart sounds: Normal heart sounds.   Pulmonary:      Effort: Pulmonary effort is normal. No respiratory distress.      Breath sounds: Normal breath sounds.   Musculoskeletal:      Cervical back: Neck supple.      Right lower leg: No edema.      Left lower leg: No edema.   Lymphadenopathy:      Cervical: No cervical adenopathy.   Skin:     General: Skin is warm and dry.   Neurological:      Mental Status: She is alert and oriented to person, place, and time.   Psychiatric:         Mood and Affect: Mood normal.         Behavior: Behavior normal.        Cerumen impaction noted in left ear.  Wax removed " by water lavage.  She tolerated without difficulty.        Assessment and Plan    Diagnoses and all orders for this visit:    1. ETD (Eustachian tube dysfunction), bilateral (Primary)  -     fluticasone (FLONASE) 50 MCG/ACT nasal spray; 1-2 sprays in each nostril once daily for allergies  Dispense: 16 g; Refill: 5  -     cetirizine (zyrTEC) 10 MG tablet; Take 1 tablet by mouth At Night As Needed for Allergies.  Dispense: 30 tablet; Refill: 12    2. Decreased hearing of right ear    3. Impacted cerumen of left ear  Comments:  resolved       Begin Flonase nasal spray daily and Zyrtec daily  Increase fluids  Medication and medication adverse effects discussed.    Follow-up 5-7 days for reevaluation if not improved or sooner if needed.       Follow Up   Return if symptoms worsen or fail to improve.  Patient was given instructions and counseling regarding her condition or for health maintenance advice. Please see specific information pulled into the AVS if appropriate.    There are no Patient Instructions on file for this visit.

## 2025-01-21 ENCOUNTER — TELEPHONE (OUTPATIENT)
Dept: FAMILY MEDICINE CLINIC | Facility: CLINIC | Age: 65
End: 2025-01-21
Payer: COMMERCIAL

## 2025-01-29 ENCOUNTER — OFFICE VISIT (OUTPATIENT)
Age: 65
End: 2025-01-29
Payer: COMMERCIAL

## 2025-01-29 VITALS
WEIGHT: 191.9 LBS | HEART RATE: 57 BPM | HEIGHT: 70 IN | BODY MASS INDEX: 27.47 KG/M2 | TEMPERATURE: 97.8 F | OXYGEN SATURATION: 98 % | RESPIRATION RATE: 20 BRPM | SYSTOLIC BLOOD PRESSURE: 131 MMHG | DIASTOLIC BLOOD PRESSURE: 77 MMHG

## 2025-01-29 DIAGNOSIS — C18.2 MALIGNANT NEOPLASM OF ASCENDING COLON: Primary | ICD-10-CM

## 2025-01-29 DIAGNOSIS — Z85.038 HISTORY OF COLON CANCER: ICD-10-CM

## 2025-01-29 PROCEDURE — 99213 OFFICE O/P EST LOW 20 MIN: CPT | Performed by: STUDENT IN AN ORGANIZED HEALTH CARE EDUCATION/TRAINING PROGRAM

## 2025-01-29 NOTE — PROGRESS NOTES
Colorectal Surgery Followup Note    ID:  Mali Vann;   : 1960  DATE OF VISIT: 2025    Chief Complaint  Follow-up (FU - Laparoscopic Total Colectomy with Davinci Robot and Ileostomy 10/22)       Subjective     Ms. Vann is status post total abdominal colectomy with end ileostomy.  She has been doing great.  She reports no nausea or vomiting.  She reports good appetite.  She reports good energy levels.  Her ostomy is functioning well and able to take care of it.     Exam  General:  No acute distress  Head: Normocephalic, atraumatic  Neuro: Alert and oriented     Abdomen:  Soft, non-tender, non-distended, no hernias, no hepatomegaly, no splenomegaly. No abnormal, audible bowel sounds.  All incisions well-healed and intact.  All staples have been removed.     Assessment  -Recurrent colon cancer status post total abdominal colectomy with end ileostomy     Plan / Recommendations  -Ms. Vann has recovered well.   - We have discussed surveillance and what it entails. We will follow the NCCN guideline         - We will vet CT chest Abdomen and pelvis with CEA in 6 month from surgery   - We will perform proctoscopy in the next clinic visit   - follow up in 4-6 months         Dickson Arizmendi MD  Colon and Rectal Surgery   [facility]

## 2025-02-20 ENCOUNTER — HOSPITAL ENCOUNTER (OUTPATIENT)
Dept: CT IMAGING | Facility: HOSPITAL | Age: 65
Discharge: HOME | End: 2025-02-20
Admitting: STUDENT IN AN ORGANIZED HEALTH CARE EDUCATION/TRAINING PROGRAM
Payer: COMMERCIAL

## 2025-02-20 DIAGNOSIS — C18.2 MALIGNANT NEOPLASM OF ASCENDING COLON: ICD-10-CM

## 2025-02-20 DIAGNOSIS — Z85.038 HISTORY OF COLON CANCER: ICD-10-CM

## 2025-02-20 PROCEDURE — 25510000001 IOPAMIDOL PER 1 ML: Performed by: STUDENT IN AN ORGANIZED HEALTH CARE EDUCATION/TRAINING PROGRAM

## 2025-02-20 PROCEDURE — 71260 CT THORAX DX C+: CPT

## 2025-02-20 PROCEDURE — 74177 CT ABD & PELVIS W/CONTRAST: CPT

## 2025-02-20 RX ORDER — IOPAMIDOL 755 MG/ML
100 INJECTION, SOLUTION INTRAVASCULAR
Status: COMPLETED | OUTPATIENT
Start: 2025-02-20 | End: 2025-02-20

## 2025-02-20 RX ADMIN — IOPAMIDOL 100 ML: 755 INJECTION, SOLUTION INTRAVENOUS at 14:06

## 2025-03-21 ENCOUNTER — TELEPHONE (OUTPATIENT)
Age: 65
End: 2025-03-21
Payer: COMMERCIAL

## 2025-03-21 NOTE — TELEPHONE ENCOUNTER
Patient called and stated that her ileostomy is burning when she has output. She is wondering if there is anything she can do in regards to this.  Message sent to mack

## 2025-03-24 NOTE — TELEPHONE ENCOUNTER
Patient made aware and stated that it is her stoma specifically that is burning not the skin around her stoma. Message to mack sent

## 2025-03-26 RX ORDER — SUCRALFATE 1 G/1
1 TABLET ORAL 4 TIMES DAILY
Qty: 120 TABLET | Refills: 1 | Status: SHIPPED | OUTPATIENT
Start: 2025-03-26 | End: 2026-03-26

## 2025-04-01 DIAGNOSIS — F33.42 RECURRENT MAJOR DEPRESSIVE DISORDER, IN FULL REMISSION: ICD-10-CM

## 2025-04-02 DIAGNOSIS — R73.03 PREDIABETES: ICD-10-CM

## 2025-04-02 DIAGNOSIS — E78.5 DYSLIPIDEMIA: ICD-10-CM

## 2025-04-02 DIAGNOSIS — Z00.00 WELLNESS EXAMINATION: ICD-10-CM

## 2025-04-02 DIAGNOSIS — R53.83 FATIGUE, UNSPECIFIED TYPE: ICD-10-CM

## 2025-04-02 DIAGNOSIS — E78.5 DYSLIPIDEMIA: Primary | ICD-10-CM

## 2025-04-02 DIAGNOSIS — D64.9 ANEMIA, UNSPECIFIED TYPE: ICD-10-CM

## 2025-04-03 LAB
ALBUMIN SERPL-MCNC: 4.6 G/DL (ref 3.9–4.9)
ALP SERPL-CCNC: 98 IU/L (ref 44–121)
ALT SERPL-CCNC: 31 IU/L (ref 0–32)
AST SERPL-CCNC: 30 IU/L (ref 0–40)
BASOPHILS # BLD AUTO: 0.1 X10E3/UL (ref 0–0.2)
BASOPHILS NFR BLD AUTO: 1 %
BILIRUB SERPL-MCNC: 0.5 MG/DL (ref 0–1.2)
BUN SERPL-MCNC: 7 MG/DL (ref 8–27)
BUN/CREAT SERPL: 8 (ref 12–28)
CALCIUM SERPL-MCNC: 9.7 MG/DL (ref 8.7–10.3)
CHLORIDE SERPL-SCNC: 103 MMOL/L (ref 96–106)
CHOLEST SERPL-MCNC: 209 MG/DL (ref 100–199)
CO2 SERPL-SCNC: 23 MMOL/L (ref 20–29)
CREAT SERPL-MCNC: 0.9 MG/DL (ref 0.57–1)
EGFRCR SERPLBLD CKD-EPI 2021: 71 ML/MIN/1.73
EOSINOPHIL # BLD AUTO: 0.2 X10E3/UL (ref 0–0.4)
EOSINOPHIL NFR BLD AUTO: 3 %
ERYTHROCYTE [DISTWIDTH] IN BLOOD BY AUTOMATED COUNT: 18 % (ref 11.7–15.4)
GLOBULIN SER CALC-MCNC: 2.7 G/DL (ref 1.5–4.5)
GLUCOSE SERPL-MCNC: 95 MG/DL (ref 70–99)
HBA1C MFR BLD: 6.1 % (ref 4.8–5.6)
HCT VFR BLD AUTO: 37.4 % (ref 34–46.6)
HDLC SERPL-MCNC: 32 MG/DL
HGB BLD-MCNC: 11.4 G/DL (ref 11.1–15.9)
IMM GRANULOCYTES # BLD AUTO: 0 X10E3/UL (ref 0–0.1)
IMM GRANULOCYTES NFR BLD AUTO: 0 %
LDLC SERPL CALC-MCNC: 125 MG/DL (ref 0–99)
LYMPHOCYTES # BLD AUTO: 1.3 X10E3/UL (ref 0.7–3.1)
LYMPHOCYTES NFR BLD AUTO: 26 %
MCH RBC QN AUTO: 24.1 PG (ref 26.6–33)
MCHC RBC AUTO-ENTMCNC: 30.5 G/DL (ref 31.5–35.7)
MCV RBC AUTO: 79 FL (ref 79–97)
MONOCYTES # BLD AUTO: 0.3 X10E3/UL (ref 0.1–0.9)
MONOCYTES NFR BLD AUTO: 6 %
NEUTROPHILS # BLD AUTO: 3.1 X10E3/UL (ref 1.4–7)
NEUTROPHILS NFR BLD AUTO: 64 %
PLATELET # BLD AUTO: 255 X10E3/UL (ref 150–450)
POTASSIUM SERPL-SCNC: 4.8 MMOL/L (ref 3.5–5.2)
PROT SERPL-MCNC: 7.3 G/DL (ref 6–8.5)
RBC # BLD AUTO: 4.73 X10E6/UL (ref 3.77–5.28)
SODIUM SERPL-SCNC: 139 MMOL/L (ref 134–144)
TRIGL SERPL-MCNC: 294 MG/DL (ref 0–149)
TSH SERPL DL<=0.005 MIU/L-ACNC: 3.21 UIU/ML (ref 0.45–4.5)
VLDLC SERPL CALC-MCNC: 52 MG/DL (ref 5–40)
WBC # BLD AUTO: 4.9 X10E3/UL (ref 3.4–10.8)

## 2025-04-28 ENCOUNTER — OFFICE VISIT (OUTPATIENT)
Age: 65
End: 2025-04-28
Payer: MEDICAID

## 2025-04-28 VITALS
TEMPERATURE: 97.5 F | OXYGEN SATURATION: 97 % | HEIGHT: 70 IN | HEART RATE: 54 BPM | RESPIRATION RATE: 22 BRPM | WEIGHT: 193 LBS | DIASTOLIC BLOOD PRESSURE: 59 MMHG | SYSTOLIC BLOOD PRESSURE: 121 MMHG | BODY MASS INDEX: 27.63 KG/M2

## 2025-04-28 DIAGNOSIS — C18.2 MALIGNANT NEOPLASM OF ASCENDING COLON: Primary | ICD-10-CM

## 2025-04-28 DIAGNOSIS — Z85.038 HISTORY OF COLON CANCER: ICD-10-CM

## 2025-04-29 NOTE — PROGRESS NOTES
Colorectal Surgery Followup Note    ID:  Mali Vann;   : 1960  DATE OF VISIT: 2025    Chief Complaint  Follow-up (FU - Total Colectomy and Ileostomy performed 10/22/25 due to colon cancer)    Subjective     Ms. Vann is status post total abdominal colectomy with end ileostomy.  She has been doing great.  She reports no nausea or vomiting.  She reports good appetite.  She reports good energy levels.  Her ostomy is functioning well and able to take care of it. She is currently taking Carafate for burning sensation at the stoma, which has greatly improved her symptoms.    Exam  General:  No acute distress  Head: Normocephalic, atraumatic  Neuro: Alert and oriented     Abdomen:  Soft, non-tender, non-distended, no hernias, no hepatomegaly, no splenomegaly. No abnormal, audible bowel sounds.  All incisions well-healed. Ostomy pouch in place and intact.      Assessment  -Recurrent colon cancer status post total abdominal colectomy with end ileostomy     Plan / Recommendations  -Ms. Vann has recovered well.   -We have discussed surveillance and what it entails. We will follow the NCCN guideline         - We discussed the results of the recent CT chest and abdomen/pelvis performed on 25. We will repeat CT scans and CEA in 4 months from today.  - follow up in 4-6 months         Rajwinder Mao PA-C  Colon and Rectal Surgery   [facility]

## 2025-05-20 DIAGNOSIS — Z85.038 HISTORY OF COLON CANCER: Primary | ICD-10-CM

## 2025-05-21 RX ORDER — SUCRALFATE 1 G/1
1 TABLET ORAL 4 TIMES DAILY
Qty: 120 TABLET | Refills: 0 | Status: SHIPPED | OUTPATIENT
Start: 2025-05-21

## 2025-06-14 DIAGNOSIS — Z85.038 HISTORY OF COLON CANCER: ICD-10-CM

## 2025-06-16 RX ORDER — SUCRALFATE 1 G/1
1 TABLET ORAL 4 TIMES DAILY
Qty: 120 TABLET | Refills: 0 | Status: SHIPPED | OUTPATIENT
Start: 2025-06-16

## 2025-06-27 ENCOUNTER — TELEPHONE (OUTPATIENT)
Age: 65
End: 2025-06-27
Payer: COMMERCIAL

## 2025-06-27 NOTE — TELEPHONE ENCOUNTER
Hub staff attempted to follow warm transfer process and was unsuccessful     Caller: Mali Vann     Relationship to patient:  SELF     Best call back number: [phone]     Patient is needing:  PATIENT WOULD LIKE TO BE PRESCRIBED AN ANTIBIOTIC REGARDING HER STOMA

## 2025-06-27 NOTE — TELEPHONE ENCOUNTER
Still with burning medication not helping like it used to. Concerned about the stoma. Wanted to know if she could have antibiotic ointment or cream to help with it. Reports thinks has a fungus on there. Burning and pain. Wants to know if she should quit the sucralfate she is currently taking?

## 2025-07-03 NOTE — TELEPHONE ENCOUNTER
Call from patient wanting to know if Dr. Arizmendi has reviewed the pictures. Advised I would send a message to him and we would get back with her.    Message sent to Dr. Arizmendi.

## 2025-07-07 ENCOUNTER — PREP FOR SURGERY (OUTPATIENT)
Dept: OTHER | Facility: HOSPITAL | Age: 65
End: 2025-07-07
Payer: COMMERCIAL

## 2025-07-07 DIAGNOSIS — Z85.038 HISTORY OF COLON CANCER: ICD-10-CM

## 2025-07-07 RX ORDER — BUDESONIDE 3 MG/1
9 CAPSULE, COATED PELLETS ORAL EVERY MORNING
Qty: 60 CAPSULE | Refills: 2 | Status: SHIPPED | OUTPATIENT
Start: 2025-07-07

## 2025-07-07 RX ORDER — SODIUM CHLORIDE, SODIUM LACTATE, POTASSIUM CHLORIDE, CALCIUM CHLORIDE 600; 310; 30; 20 MG/100ML; MG/100ML; MG/100ML; MG/100ML
30 INJECTION, SOLUTION INTRAVENOUS CONTINUOUS
OUTPATIENT
Start: 2025-07-07 | End: 2025-07-08

## 2025-07-08 DIAGNOSIS — F33.42 RECURRENT MAJOR DEPRESSIVE DISORDER, IN FULL REMISSION: ICD-10-CM

## 2025-07-14 ENCOUNTER — ANESTHESIA EVENT (OUTPATIENT)
Dept: GASTROENTEROLOGY | Facility: HOSPITAL | Age: 65
End: 2025-07-14
Payer: COMMERCIAL

## 2025-07-14 ENCOUNTER — ANESTHESIA (OUTPATIENT)
Dept: GASTROENTEROLOGY | Facility: HOSPITAL | Age: 65
End: 2025-07-14
Payer: COMMERCIAL

## 2025-07-14 ENCOUNTER — HOSPITAL ENCOUNTER (OUTPATIENT)
Facility: HOSPITAL | Age: 65
Setting detail: HOSPITAL OUTPATIENT SURGERY
Discharge: HOME | End: 2025-07-14
Attending: STUDENT IN AN ORGANIZED HEALTH CARE EDUCATION/TRAINING PROGRAM | Admitting: STUDENT IN AN ORGANIZED HEALTH CARE EDUCATION/TRAINING PROGRAM
Payer: COMMERCIAL

## 2025-07-14 VITALS
HEIGHT: 70 IN | HEART RATE: 49 BPM | OXYGEN SATURATION: 96 % | DIASTOLIC BLOOD PRESSURE: 61 MMHG | WEIGHT: 193.4 LBS | RESPIRATION RATE: 14 BRPM | TEMPERATURE: 98.2 F | SYSTOLIC BLOOD PRESSURE: 127 MMHG | BODY MASS INDEX: 27.69 KG/M2

## 2025-07-14 DIAGNOSIS — Z85.038 HISTORY OF COLON CANCER: ICD-10-CM

## 2025-07-14 PROCEDURE — 25010000002 PROPOFOL 200 MG/20ML EMULSION: Performed by: NURSE ANESTHETIST, CERTIFIED REGISTERED

## 2025-07-14 PROCEDURE — 25810000003 SODIUM CHLORIDE 0.9 % SOLUTION: Performed by: ANESTHESIOLOGY

## 2025-07-14 PROCEDURE — 25810000003 SODIUM CHLORIDE 0.9 % SOLUTION: Performed by: NURSE ANESTHETIST, CERTIFIED REGISTERED

## 2025-07-14 PROCEDURE — 88312 SPECIAL STAINS GROUP 1: CPT | Performed by: STUDENT IN AN ORGANIZED HEALTH CARE EDUCATION/TRAINING PROGRAM

## 2025-07-14 PROCEDURE — 44382 SMALL BOWEL ENDOSCOPY: CPT | Performed by: STUDENT IN AN ORGANIZED HEALTH CARE EDUCATION/TRAINING PROGRAM

## 2025-07-14 PROCEDURE — 88305 TISSUE EXAM BY PATHOLOGIST: CPT | Performed by: STUDENT IN AN ORGANIZED HEALTH CARE EDUCATION/TRAINING PROGRAM

## 2025-07-14 RX ORDER — SODIUM CHLORIDE 9 MG/ML
50 INJECTION, SOLUTION INTRAVENOUS ONCE
Status: COMPLETED | OUTPATIENT
Start: 2025-07-14 | End: 2025-07-14

## 2025-07-14 RX ORDER — MEPERIDINE HYDROCHLORIDE 25 MG/ML
12.5 INJECTION INTRAMUSCULAR; INTRAVENOUS; SUBCUTANEOUS
Status: DISCONTINUED | OUTPATIENT
Start: 2025-07-14 | End: 2025-07-14 | Stop reason: HOSPADM

## 2025-07-14 RX ORDER — IPRATROPIUM BROMIDE AND ALBUTEROL SULFATE 2.5; .5 MG/3ML; MG/3ML
3 SOLUTION RESPIRATORY (INHALATION) ONCE AS NEEDED
Status: DISCONTINUED | OUTPATIENT
Start: 2025-07-14 | End: 2025-07-14 | Stop reason: HOSPADM

## 2025-07-14 RX ORDER — DIPHENHYDRAMINE HYDROCHLORIDE 50 MG/ML
12.5 INJECTION, SOLUTION INTRAMUSCULAR; INTRAVENOUS
Status: DISCONTINUED | OUTPATIENT
Start: 2025-07-14 | End: 2025-07-14 | Stop reason: HOSPADM

## 2025-07-14 RX ORDER — SODIUM CHLORIDE 9 MG/ML
INJECTION, SOLUTION INTRAVENOUS CONTINUOUS PRN
Status: DISCONTINUED | OUTPATIENT
Start: 2025-07-14 | End: 2025-07-14 | Stop reason: SURG

## 2025-07-14 RX ORDER — EPHEDRINE SULFATE 5 MG/ML
5 INJECTION INTRAVENOUS ONCE AS NEEDED
Status: DISCONTINUED | OUTPATIENT
Start: 2025-07-14 | End: 2025-07-14 | Stop reason: HOSPADM

## 2025-07-14 RX ORDER — ONDANSETRON 2 MG/ML
4 INJECTION INTRAMUSCULAR; INTRAVENOUS ONCE AS NEEDED
Status: DISCONTINUED | OUTPATIENT
Start: 2025-07-14 | End: 2025-07-14 | Stop reason: HOSPADM

## 2025-07-14 RX ORDER — PROPOFOL 10 MG/ML
INJECTION, EMULSION INTRAVENOUS AS NEEDED
Status: DISCONTINUED | OUTPATIENT
Start: 2025-07-14 | End: 2025-07-14 | Stop reason: SURG

## 2025-07-14 RX ORDER — LABETALOL HYDROCHLORIDE 5 MG/ML
5 INJECTION, SOLUTION INTRAVENOUS
Status: DISCONTINUED | OUTPATIENT
Start: 2025-07-14 | End: 2025-07-14 | Stop reason: HOSPADM

## 2025-07-14 RX ORDER — HYDRALAZINE HYDROCHLORIDE 20 MG/ML
5 INJECTION INTRAMUSCULAR; INTRAVENOUS
Status: DISCONTINUED | OUTPATIENT
Start: 2025-07-14 | End: 2025-07-14 | Stop reason: HOSPADM

## 2025-07-14 RX ADMIN — SODIUM CHLORIDE: 9 INJECTION, SOLUTION INTRAVENOUS at 12:19

## 2025-07-14 RX ADMIN — PROPOFOL 180 MG: 10 INJECTION, EMULSION INTRAVENOUS at 12:24

## 2025-07-14 RX ADMIN — SODIUM CHLORIDE 50 ML/HR: 9 INJECTION, SOLUTION INTRAVENOUS at 12:08

## 2025-07-14 NOTE — H&P
Colorectal Surgery Preop Note    ID:  Mali Vann;     Chief Complaint  Ileitis     History of Present Illness  Mali Vann is a 64 y.o. female who is here for issue with ostomy      Past Medical History  Past Medical History:   Diagnosis Date    Colon cancer     Depression     Dyslipidemia     History of colon cancer     Lung nodule     Prediabetes 02/21/2024     For further details please see prior notes and Health History Questionnaire scanned in Epic.  Past Surgical History  Past Surgical History:   Procedure Laterality Date    CHOLECYSTECTOMY  2021    in [place]    COLON RESECTION N/A 10/22/2024    Procedure: LAPAROSCOPIC TOTAL COLECTOMY WITH DAVINCI ROBOT AND ILEOSTOMY;  Surgeon: Dickson Arizmendi MD;  Location: [facility] MAIN OR;  Service: Robotics - DaVinci;  Laterality: N/A;    COLON SURGERY  1990    12-18 inches colon removed due to cancer; In [place], [place]    COLONOSCOPY      in [place], [place] Dr. Beck    COLONOSCOPY N/A 9/18/2024    Procedure: COLONOSCOPY with biopsy of cecal mass x3 areas,cold snare polypectomy x8.hot snare polypectomy x2 and tatooing x2 areas;  Surgeon: Dickson Arizmendi MD;  Location: [facility] ENDOSCOPY;  Service: General;  Laterality: N/A;  cecal mass,colon polyps,colon mass    CYSTOSCOPY N/A 10/22/2024    Procedure: CYSTOSCOPY;  Surgeon: Dickson Arizmendi MD;  Location: [facility] MAIN OR;  Service: Robotics - DaVinci;  Laterality: N/A;     For further details please see prior notes and Health History Questionnaire scanned in Epic.  Family History  Family History   Problem Relation Age of Onset    Hypertension Mother     Diabetes Father     Lung cancer Father         Associated to tobacco smoking.    Cancer Sister         The source of the malignancy was not clear but was metastatic.    Kidney cancer Maternal Grandmother     Lymphoma Maternal Grandfather      For further details please see prior notes and Health History Questionnaire scanned in Epic.  Social History  Social History     Tobacco Use     Smoking status: Former     Current packs/day: 0.00     Average packs/day: 1 pack/day for 4.0 years (4.0 ttl pk-yrs)     Types: Cigarettes     Start date: 1977     Quit date: 1981     Years since quittin.7    Smokeless tobacco: Never   Vaping Use    Vaping status: Never Used   Substance Use Topics    Alcohol use: Not Currently    Drug use: Not Currently     Comment: I dont remember     For further details please see prior notes and Health History Questionnaire scanned in Epic.  Medication List  No current facility-administered medications for this encounter.    Current Outpatient Medications:     sertraline (ZOLOFT) 50 MG tablet, Take 1 tablet by mouth once daily, Disp: 90 tablet, Rfl: 0    sucralfate (CARAFATE) 1 g tablet, Take 1 tablet by mouth 4 times daily, Disp: 120 tablet, Rfl: 0    Budesonide (Entocort EC) 3 MG 24 hr capsule, Take 3 capsules by mouth Every Morning., Disp: 60 capsule, Rfl: 2  For further details please see prior notes and Health History Questionnaire scanned in Epic.  Allergies  Allergies   Allergen Reactions    Penicillins Rash     As child        Review of Systems  Pertinent positives noted in HPI.    Physical Exam  General:  No acute distress  Head: Normocephalic, atraumatic  CV: Regular rate, no edema, extremities warm and well perfused  Pulm: Symmetric chest rise, non-labored breathing  Neuro: Alert and oriented     Abdomen: Please see clinic note  Anorectal: Please see clinic note    Assessment  - issue with ostomy       Plan / Recommendations    1. Proceed to endo for ileoscopy and biopsy       Dickson Arizmendi MD  Colon and Rectal Surgery   [facility]

## 2025-07-14 NOTE — DISCHARGE INSTRUCTIONS
A responsible adult should stay with you and you should rest quietly for the rest of the day.    Do not drink alcohol, drive, operate any heavy machinery or power tools or make any legal/important decisions for the next 24 hours.     If you begin to experience severe pain, increased shortness of breath, racing heartbeat or a fever above 101 F, seek immediate medical attention.     Follow up with MD as instructed. Call office for results in 3 to 5 days if needed.     Dr Arizmendi Office at [phone]

## 2025-07-14 NOTE — ANESTHESIA POSTPROCEDURE EVALUATION
Patient: Mali Vann    Procedure Summary       Date: 07/14/25 Room / Location: [facility] ENDOSCOPY 1 / [facility] ENDOSCOPY    Anesthesia Start: 1219 Anesthesia Stop: 1236    Procedure: ILEOSCOPY with BIOPSY (Rectum) Diagnosis:       History of colon cancer      (History of colon cancer [Z85.038])    Surgeons: Dickson Arizmendi MD Provider: Victorino Yu MD    Anesthesia Type: MAC, general ASA Status: 2            Anesthesia Type: MAC, general    Vitals  Vitals Value Taken Time   /61 07/14/25 13:02   Temp     Pulse 43 07/14/25 13:03   Resp 14 07/14/25 13:02   SpO2 95 % 07/14/25 13:03   Vitals shown include unfiled device data.        Post Anesthesia Care and Evaluation    Patient location during evaluation: PACU  Patient participation: complete - patient participated  Level of consciousness: awake  Pain scale: See nurse's notes for pain score.  Pain management: adequate    Airway patency: patent  Anesthetic complications: No anesthetic complications  PONV Status: none  Cardiovascular status: acceptable  Respiratory status: acceptable and spontaneous ventilation  Hydration status: acceptable    Comments: Patient seen and examined postoperatively; vital signs stable; SpO2 greater than or equal to 90%; cardiopulmonary status stable; nausea/vomiting adequately controlled; pain adequately controlled; no apparent anesthesia complications; patient discharged from anesthesia care when discharge criteria were met

## 2025-07-14 NOTE — ANESTHESIA PREPROCEDURE EVALUATION
Anesthesia Evaluation     Patient summary reviewed and Nursing notes reviewed   NPO Solid Status: > 8 hours  NPO Liquid Status: > 8 hours           Airway   Mallampati: II  TM distance: >3 FB  Neck ROM: full  No difficulty expected  Dental - normal exam     Pulmonary    Cardiovascular         Neuro/Psych  (+) psychiatric history Depression  GI/Hepatic/Renal/Endo      Musculoskeletal     Abdominal    Substance History      OB/GYN          Other      history of cancer                Anesthesia Plan    ASA 2     MAC and general     intravenous induction     Anesthetic plan, risks, benefits, and alternatives have been provided, discussed and informed consent has been obtained with: patient.    Plan discussed with CRNA.    CODE STATUS:

## 2025-07-15 LAB
LAB AP CASE REPORT: NORMAL
PATH REPORT.FINAL DX SPEC: NORMAL
PATH REPORT.GROSS SPEC: NORMAL

## 2025-07-16 RX ORDER — NYSTATIN 100000 [USP'U]/G
POWDER TOPICAL
Qty: 60 G | Refills: 0 | Status: SHIPPED | OUTPATIENT
Start: 2025-07-16 | End: 2026-07-16

## 2025-07-28 NOTE — OP NOTE
[facility] Colorectal Surgery  Colonoscopy Examination     Name: Mali Vann  : 1960  Date of Colonoscopy: 2025  Procedure: Ileoscopy with biopsy   Surgeon: Dickson Arizmendi MD   Anesthesia: Sedation   IV Fluids: refer to anesthesia record    Procedure Details:    Prior to the procedure, history and physical exam was performed. Patient's medication and allergies were reviewed. The risk and benefits of the procedure and sedation options and risks were discussed with the patient. All questions were answered and informed consent was obtained.    The patient was brought to the endoscopy suite and placed in supine position. Conscious sedation was administered by the anesthesia team. Vital signs including blood pressure, heart rate, and oxygen saturation were monitored continuously throughout the procedure.    The EGD scope was introduced through the ileostomy and advanced to 30 cm under direct visualization. The scope was maneuvered carefully, and the mucosa of the terminal ileum was thoroughly inspected. Adequate insufflation was maintained throughout the procedure for optimal visualization.    Findings:  The end ileostomy was intubated without difficulty. The mucosa at the ostomy site appeared irregular and erythematous with whitish plaques concerning for fungal infection. Further inspection of the terminal ileum revealed friable, edematous mucosa with ulcerations and areas of mucosal sloughing consistent with chronic radiation enteritis. No active bleeding was noted during the procedure.    Intervention:  Biopsies were taken from both the ostomy site and the affected terminal ileum using cold forceps. Samples were sent for histopathological evaluation including fungal stains and culture as indicated.    Complications: None    Estimated Blood Loss: Minimal    Disposition: Patient tolerated the procedure well and returned to baseline. Awaiting pathology results to guide further management. Start antifungal therapy/      Impression:    Mucosal changes at ostomy site concerning for fungal infection    Terminal ileum with findings consistent with chronic radiation-induced changes    Biopsies obtained for definitive diagnosis          Dickson Arizmendi MD  Colon and Rectal Surgery   [id number] [place], [address]  T: [phone]

## 2025-08-09 ENCOUNTER — HOSPITAL ENCOUNTER (OUTPATIENT)
Dept: CT IMAGING | Facility: HOSPITAL | Age: 65
Discharge: HOME | End: 2025-08-09
Payer: MEDICARE

## 2025-08-09 DIAGNOSIS — Z85.038 HISTORY OF COLON CANCER: ICD-10-CM

## 2025-08-09 DIAGNOSIS — C18.2 MALIGNANT NEOPLASM OF ASCENDING COLON: ICD-10-CM

## 2025-08-09 PROCEDURE — 71260 CT THORAX DX C+: CPT

## 2025-08-09 PROCEDURE — 74177 CT ABD & PELVIS W/CONTRAST: CPT

## 2025-08-09 PROCEDURE — 25510000001 IOPAMIDOL PER 1 ML: Performed by: PHYSICIAN ASSISTANT

## 2025-08-09 RX ORDER — IOPAMIDOL 755 MG/ML
100 INJECTION, SOLUTION INTRAVASCULAR
Status: COMPLETED | OUTPATIENT
Start: 2025-08-09 | End: 2025-08-09

## 2025-08-09 RX ADMIN — IOPAMIDOL 100 ML: 755 INJECTION, SOLUTION INTRAVENOUS at 09:19

## 2025-08-13 ENCOUNTER — TELEPHONE (OUTPATIENT)
Age: 65
End: 2025-08-13
Payer: COMMERCIAL

## 2025-08-13 RX ORDER — CLOTRIMAZOLE 1 %
1 CREAM (GRAM) TOPICAL 2 TIMES DAILY
Qty: 14 G | Refills: 0 | Status: SHIPPED | OUTPATIENT
Start: 2025-08-13

## (undated) DEVICE — MARKR SKIN W/RULR

## (undated) DEVICE — CELLULAR STAPLER WITH TRI-STAPLE TECHNOLOGY
Type: IMPLANTABLE DEVICE | Site: ABDOMEN | Status: NON-FUNCTIONAL
Brand: EEA

## (undated) DEVICE — COVER,MAYO STAND,XL,STERILE: Brand: MEDLINE

## (undated) DEVICE — KT SURG TURNOVER 050

## (undated) DEVICE — DRSNG WND BORDR/ADHS NONADHR/GZ LF 4X4IN STRL

## (undated) DEVICE — PENCL HND ROCKRSWTCH HOLSTR EZ CLEAN TP CRD 10FT

## (undated) DEVICE — GENERAL LAPAROSCOPY CDS: Brand: MEDLINE INDUSTRIES, INC.

## (undated) DEVICE — SOL IRRG H2O BG 3000ML STRL

## (undated) DEVICE — PAD, GROUNDING, UNIVERSAL, SPLIT, 9': Brand: MEDLINE

## (undated) DEVICE — CIRCULAR STAPLER WITH DST SERIES TECHNOLOGY
Type: IMPLANTABLE DEVICE | Site: ABDOMEN | Status: NON-FUNCTIONAL
Brand: EEA

## (undated) DEVICE — SOL IRRIG NACL 1000ML

## (undated) DEVICE — 2, DISPOSABLE SUCTION/IRRIGATOR WITH DISPOSABLE TIP: Brand: STRYKEFLOW

## (undated) DEVICE — ANTIBACTERIAL UNDYED BRAIDED (POLYGLACTIN 910), SYNTHETIC ABSORBABLE SUTURE: Brand: COATED VICRYL

## (undated) DEVICE — VESSEL SEALER EXTEND: Brand: ENDOWRIST

## (undated) DEVICE — SINGLE-USE BIOPSY FORCEPS: Brand: RADIAL JAW 4

## (undated) DEVICE — SUT VIC 0 UR6 27IN VCP603H

## (undated) DEVICE — TRAP WIDEEYE POLYP

## (undated) DEVICE — PK ENDO GI 50

## (undated) DEVICE — SUCTION IRRIGATOR: Brand: ENDOWRIST

## (undated) DEVICE — THE CARR-LOCKE INJECTION NEEDLE IS A SINGLE USE, DISPOSABLE, FLEXIBLE SHEATH INJECTION NEEDLE USED FOR THE INJECTION OF VARIOUS TYPES OF MEDIA THROUGH FLEXIBLE ENDOSCOPES.

## (undated) DEVICE — SEAL

## (undated) DEVICE — TROC BLADLES AIRSEAL/OPTI THRD 8X120MM 1P/U

## (undated) DEVICE — CVR HNDL LT CAM LB54

## (undated) DEVICE — NDL HYPO PRECISIONGLIDE/REG 18G 11/2 PNK

## (undated) DEVICE — ACCESS PLATFORM FOR MINIMALLY INVASIVE SURGERY.: Brand: GELPORT® LAPAROSCOPIC  SYSTEM

## (undated) DEVICE — YANKAUER,BULB TIP,W/O VENT,RIGID,STERILE: Brand: MEDLINE

## (undated) DEVICE — MARKER SKIN W/RULER DUAL: Brand: MEDLINE INDUSTRIES, INC.

## (undated) DEVICE — ELECTRD BLD EZ CLN STD 6.5IN

## (undated) DEVICE — ANTIBACTERIAL VIOLET BRAIDED (POLYGLACTIN 910), SYNTHETIC ABSORBABLE SUTURE: Brand: COATED VICRYL

## (undated) DEVICE — SUT ANTIBAC PDS PLS CT/2 SZ0 27IN VIL PDP334H

## (undated) DEVICE — PROXIMATE RH ROTATING HEAD SKIN STAPLERS (35 WIDE) CONTAINS 35 STAINLESS STEEL STAPLES: Brand: PROXIMATE

## (undated) DEVICE — BLANKT WARM UPPR/BDY ARM/OUT 57X196CM

## (undated) DEVICE — ST TBG AIRSEAL FLTR TRI LUM

## (undated) DEVICE — SHEET,DRAPE,53X77,STERILE: Brand: MEDLINE

## (undated) DEVICE — BLADELESS OBTURATOR: Brand: WECK VISTA

## (undated) DEVICE — INTENDED FOR TISSUE SEPARATION, AND OTHER PROCEDURES THAT REQUIRE A SHARP SURGICAL BLADE TO PUNCTURE OR CUT.: Brand: BARD-PARKER ® CARBON RIB-BACK BLADES

## (undated) DEVICE — 40580 - THE PINK PAD - ADVANCED TRENDELENBURG POSITIONING KIT: Brand: 40580 - THE PINK PAD - ADVANCED TRENDELENBURG POSITIONING KIT

## (undated) DEVICE — TOTAL TRAY, DB, 100% SILI FOLEY, 16FR 10: Brand: MEDLINE

## (undated) DEVICE — LAPAROVUE VISIBILITY SYSTEM LAPAROSCOPIC SOLUTIONS: Brand: LAPAROVUE

## (undated) DEVICE — CVR HNDL LT SURG ACCSSRY BLU STRL

## (undated) DEVICE — SOLUTION,WATER,IRRIGATION,1000ML,STERILE: Brand: MEDLINE

## (undated) DEVICE — BLAKE SILICONE DRAIN, 19 FR ROUND, HUBLESS WITH 1/4" BENDABLE TROCAR: Brand: BLAKE

## (undated) DEVICE — GLOVE,SURG,SENSICARE SLT,LF,PF,6.5: Brand: MEDLINE

## (undated) DEVICE — SUT ETHLN 2/0 PS 18IN 585H

## (undated) DEVICE — CANNULA SEAL

## (undated) DEVICE — SUT PDS 2/0 CT1 27IN DYED Z339H

## (undated) DEVICE — REDUCER: Brand: ENDOWRIST

## (undated) DEVICE — COVADERM: Brand: DEROYAL

## (undated) DEVICE — SYR LUERLOK 30CC

## (undated) DEVICE — SUT VIC 0/0 UR6 27IN DYED J603H

## (undated) DEVICE — ENDOPATH XCEL WITH OPTIVIEW TECHNOLOGY BLADELESS TROCARS WITH STABILITY SLEEVES: Brand: ENDOPATH XCEL OPTIVIEW

## (undated) DEVICE — MARYLAND JAW LAPAROSCOPIC SEALER/DIVIDER COATED: Brand: LIGASURE

## (undated) DEVICE — ST TBG AIRSEAL BIF FLTR W/ACT/CHARCOAL/FLTR

## (undated) DEVICE — PASS SUT PRO BARIATRIC XL W/TROC SWABS

## (undated) DEVICE — ARM DRAPE

## (undated) DEVICE — ENDOPATH PNEUMONEEDLE INSUFFLATION NEEDLES WITH LUER LOCK CONNECTORS 120MM: Brand: ENDOPATH

## (undated) DEVICE — SUT MONOCRYL 4/0 PS2 27IN Y426H ETY426H

## (undated) DEVICE — LAPAROSCOPIC ACCESS SYSTEM: Brand: ALEXIS LAPAROSCOPIC SYSTEM

## (undated) DEVICE — SUT PDS 3/0 SH 27IN DYED Z316H

## (undated) DEVICE — ADAPT CATH CONN URETRL

## (undated) DEVICE — 3M™ IOBAN™ 2 ANTIMICROBIAL INCISE DRAPE 6650EZ: Brand: IOBAN™ 2

## (undated) DEVICE — WOUND RETRACTOR AND PROTECTOR: Brand: ALEXIS O WOUND PROTECTOR-RETRACTOR

## (undated) DEVICE — DRSNG WND BORDR/ADHS NONADHR/GZ LF 4X10IN STRL

## (undated) DEVICE — ADHS LIQ MASTISOL 2/3ML

## (undated) DEVICE — SPNG LAP PREWSH SFTPK 18X18IN STRL PK/5

## (undated) DEVICE — STAPLER 60: Brand: SUREFORM

## (undated) DEVICE — DRN WND EVAC BULB 100CC

## (undated) DEVICE — TOWEL,OR,DSP,ST,NATURAL,DLX,4/PK,20PK/CS: Brand: MEDLINE

## (undated) DEVICE — TUBING, SUCTION, 1/4" X 12', STRAIGHT: Brand: MEDLINE

## (undated) DEVICE — SUT VIC 0 SUTUPAK TIES 18IN J906G

## (undated) DEVICE — LEGGINGS, PAIR, 33X51 XL, STERILE: Brand: MEDLINE

## (undated) DEVICE — COLUMN DRAPE

## (undated) DEVICE — SNAR POLYP HOTSNARE/BRAIDED OVL/LG 7F 2.8X24MM 230CM 1P/U

## (undated) DEVICE — ORG INST STRIP/TS ADHS 2X10IN YEL STRL

## (undated) DEVICE — GLV SURG SIGNATURE ESSENTIAL PF LTX SZ7

## (undated) DEVICE — ADHS SKIN PREMIERPRO EXOFIN TOPICAL HI/VISC .5ML

## (undated) DEVICE — 450 ML BOTTLE OF 0.05% CHLORHEXIDINE GLUCONATE IN 99.95% STERILE WATER FOR IRRIGATION, USP AND APPLICATOR.: Brand: IRRISEPT ANTIMICROBIAL WOUND LAVAGE

## (undated) DEVICE — PK CYSTO 50